# Patient Record
Sex: FEMALE | Race: BLACK OR AFRICAN AMERICAN
[De-identification: names, ages, dates, MRNs, and addresses within clinical notes are randomized per-mention and may not be internally consistent; named-entity substitution may affect disease eponyms.]

---

## 2017-04-26 ENCOUNTER — HOSPITAL ENCOUNTER (EMERGENCY)
Dept: HOSPITAL 82 - ED | Age: 62
Discharge: HOME | DRG: 305 | End: 2017-04-26
Payer: COMMERCIAL

## 2017-04-26 VITALS — SYSTOLIC BLOOD PRESSURE: 105 MMHG | DIASTOLIC BLOOD PRESSURE: 57 MMHG

## 2017-04-26 VITALS — WEIGHT: 143.3 LBS | HEIGHT: 62 IN | BODY MASS INDEX: 26.37 KG/M2

## 2017-04-26 DIAGNOSIS — Z91.14: ICD-10-CM

## 2017-04-26 DIAGNOSIS — M10.072: ICD-10-CM

## 2017-04-26 DIAGNOSIS — R94.31: ICD-10-CM

## 2017-04-26 DIAGNOSIS — I10: Primary | ICD-10-CM

## 2017-04-26 LAB
ALBUMIN SERPL-MCNC: 4.4 G/DL (ref 3.2–5)
ALP SERPL-CCNC: 95 U/L (ref 38–126)
ALT SERPL-CCNC: 43 U/L (ref 9–52)
ANION GAP SERPL CALCULATED.3IONS-SCNC: 19 MMOL/L
AST SERPL-CCNC: 47 U/L (ref 14–36)
BASOPHILS NFR BLD AUTO: 1 % (ref 0–3)
BUN SERPL-MCNC: 20 MG/DL (ref 7–17)
BUN/CREAT SERPL: 16
CALCIUM SERPL-MCNC: 10.1 MG/DL (ref 8.4–10.2)
CHLORIDE SERPL-SCNC: 106 MMOL/L (ref 95–108)
CO2 SERPL-SCNC: 26 MMOL/L (ref 22–30)
CREAT SERPL-MCNC: 1.3 MG/DL (ref 0.5–1)
EOSINOPHIL NFR BLD AUTO: 1 % (ref 0–8)
ERYTHROCYTE [DISTWIDTH] IN BLOOD BY AUTOMATED COUNT: 13.2 % (ref 11.5–15.5)
GLUCOSE SERPL-MCNC: 134 MG/DL (ref 65–105)
HCT VFR BLD AUTO: 39.9 % (ref 37–47)
HGB BLD-MCNC: 14.6 G/DL (ref 12–16)
IMM GRANULOCYTES NFR BLD: 0.3 % (ref 0–1)
LYMPHOCYTES NFR BLD: 35 % (ref 15–41)
MCH RBC QN AUTO: 30 PG  CALC (ref 26–32)
MCHC RBC AUTO-ENTMCNC: 36.6 G/L CALC (ref 32–36)
MCV RBC AUTO: 81.9 FL  CALC (ref 80–100)
MONOCYTES NFR BLD AUTO: 10 % (ref 2–13)
MYOGLOBIN SERPL-MCNC: 34 NG/ML (ref 0–62)
NEUTROPHILS # BLD AUTO: 3.83 THOU/UL (ref 2–7.15)
NEUTROPHILS NFR BLD AUTO: 53 % (ref 42–76)
PLATELET # BLD AUTO: 241 THOU/UL (ref 130–400)
POTASSIUM SERPL-SCNC: 4.6 MMOL/L (ref 3.5–5.1)
PROT SERPL-MCNC: 9 G/DL (ref 6.3–8.2)
RBC # BLD AUTO: 4.87 MILL/UL (ref 4.2–5.6)
SODIUM SERPL-SCNC: 146 MMOL/L (ref 137–146)

## 2017-09-15 ENCOUNTER — HOSPITAL ENCOUNTER (INPATIENT)
Dept: HOSPITAL 82 - ED | Age: 62
LOS: 7 days | Discharge: HOME | DRG: 208 | End: 2017-09-22
Attending: INTERNAL MEDICINE | Admitting: INTERNAL MEDICINE
Payer: COMMERCIAL

## 2017-09-15 VITALS — WEIGHT: 154.32 LBS | BODY MASS INDEX: 28.4 KG/M2 | HEIGHT: 62 IN

## 2017-09-15 DIAGNOSIS — N18.3: ICD-10-CM

## 2017-09-15 DIAGNOSIS — J18.9: Primary | ICD-10-CM

## 2017-09-15 DIAGNOSIS — F12.188: ICD-10-CM

## 2017-09-15 DIAGNOSIS — F10.188: ICD-10-CM

## 2017-09-15 DIAGNOSIS — Z86.73: ICD-10-CM

## 2017-09-15 DIAGNOSIS — E11.22: ICD-10-CM

## 2017-09-15 DIAGNOSIS — J96.01: ICD-10-CM

## 2017-09-15 DIAGNOSIS — F14.188: ICD-10-CM

## 2017-09-15 DIAGNOSIS — F17.210: ICD-10-CM

## 2017-09-15 DIAGNOSIS — Z91.19: ICD-10-CM

## 2017-09-15 DIAGNOSIS — I27.2: ICD-10-CM

## 2017-09-15 DIAGNOSIS — N17.9: ICD-10-CM

## 2017-09-15 DIAGNOSIS — I16.0: ICD-10-CM

## 2017-09-15 DIAGNOSIS — Z79.4: ICD-10-CM

## 2017-09-15 DIAGNOSIS — I13.10: ICD-10-CM

## 2017-09-15 DIAGNOSIS — B19.20: ICD-10-CM

## 2017-09-15 DIAGNOSIS — M10.9: ICD-10-CM

## 2017-09-15 DIAGNOSIS — R74.8: ICD-10-CM

## 2017-09-15 LAB
BASOPHILS NFR BLD AUTO: 0 % (ref 0–3)
EOSINOPHIL NFR BLD AUTO: 2 % (ref 0–8)
ERYTHROCYTE [DISTWIDTH] IN BLOOD BY AUTOMATED COUNT: 14.3 % (ref 11.5–15.5)
HCT VFR BLD AUTO: 44.2 % (ref 37–47)
HGB BLD-MCNC: 15.8 G/DL (ref 12–16)
IMM GRANULOCYTES NFR BLD: 0.5 % (ref 0–1)
LYMPHOCYTES NFR BLD: 37 % (ref 15–41)
MCH RBC QN AUTO: 29.4 PG  CALC (ref 26–32)
MCHC RBC AUTO-ENTMCNC: 35.7 G/L CALC (ref 32–36)
MCV RBC AUTO: 82.2 FL  CALC (ref 80–100)
MONOCYTES NFR BLD AUTO: 8 % (ref 2–13)
NEUTROPHILS # BLD AUTO: 11.01 THOU/UL (ref 2–7.15)
NEUTROPHILS NFR BLD AUTO: 53 % (ref 42–76)
PLATELET # BLD AUTO: 315 THOU/UL (ref 130–400)
RBC # BLD AUTO: 5.38 MILL/UL (ref 4.2–5.6)

## 2017-09-15 PROCEDURE — S0164 INJECTION PANTROPRAZOLE: HCPCS

## 2017-09-16 VITALS — DIASTOLIC BLOOD PRESSURE: 90 MMHG | SYSTOLIC BLOOD PRESSURE: 190 MMHG

## 2017-09-16 VITALS — DIASTOLIC BLOOD PRESSURE: 76 MMHG | SYSTOLIC BLOOD PRESSURE: 144 MMHG

## 2017-09-16 VITALS — DIASTOLIC BLOOD PRESSURE: 64 MMHG | SYSTOLIC BLOOD PRESSURE: 107 MMHG

## 2017-09-16 VITALS — SYSTOLIC BLOOD PRESSURE: 190 MMHG | DIASTOLIC BLOOD PRESSURE: 90 MMHG

## 2017-09-16 VITALS — SYSTOLIC BLOOD PRESSURE: 175 MMHG | DIASTOLIC BLOOD PRESSURE: 88 MMHG

## 2017-09-16 VITALS — SYSTOLIC BLOOD PRESSURE: 155 MMHG | DIASTOLIC BLOOD PRESSURE: 82 MMHG

## 2017-09-16 VITALS — DIASTOLIC BLOOD PRESSURE: 81 MMHG | SYSTOLIC BLOOD PRESSURE: 171 MMHG

## 2017-09-16 VITALS — DIASTOLIC BLOOD PRESSURE: 84 MMHG | SYSTOLIC BLOOD PRESSURE: 189 MMHG

## 2017-09-16 VITALS — DIASTOLIC BLOOD PRESSURE: 81 MMHG | SYSTOLIC BLOOD PRESSURE: 166 MMHG

## 2017-09-16 VITALS — DIASTOLIC BLOOD PRESSURE: 86 MMHG | SYSTOLIC BLOOD PRESSURE: 195 MMHG

## 2017-09-16 VITALS — SYSTOLIC BLOOD PRESSURE: 115 MMHG | DIASTOLIC BLOOD PRESSURE: 64 MMHG

## 2017-09-16 VITALS — SYSTOLIC BLOOD PRESSURE: 181 MMHG | DIASTOLIC BLOOD PRESSURE: 100 MMHG

## 2017-09-16 VITALS — DIASTOLIC BLOOD PRESSURE: 83 MMHG | SYSTOLIC BLOOD PRESSURE: 182 MMHG

## 2017-09-16 VITALS — SYSTOLIC BLOOD PRESSURE: 182 MMHG | DIASTOLIC BLOOD PRESSURE: 83 MMHG

## 2017-09-16 VITALS — SYSTOLIC BLOOD PRESSURE: 159 MMHG | DIASTOLIC BLOOD PRESSURE: 78 MMHG

## 2017-09-16 VITALS — DIASTOLIC BLOOD PRESSURE: 65 MMHG | SYSTOLIC BLOOD PRESSURE: 131 MMHG

## 2017-09-16 VITALS — SYSTOLIC BLOOD PRESSURE: 158 MMHG | DIASTOLIC BLOOD PRESSURE: 76 MMHG

## 2017-09-16 VITALS — SYSTOLIC BLOOD PRESSURE: 220 MMHG | DIASTOLIC BLOOD PRESSURE: 109 MMHG

## 2017-09-16 VITALS — SYSTOLIC BLOOD PRESSURE: 233 MMHG | DIASTOLIC BLOOD PRESSURE: 130 MMHG

## 2017-09-16 VITALS — DIASTOLIC BLOOD PRESSURE: 78 MMHG | SYSTOLIC BLOOD PRESSURE: 174 MMHG

## 2017-09-16 VITALS — DIASTOLIC BLOOD PRESSURE: 126 MMHG | SYSTOLIC BLOOD PRESSURE: 222 MMHG

## 2017-09-16 VITALS — DIASTOLIC BLOOD PRESSURE: 85 MMHG | SYSTOLIC BLOOD PRESSURE: 165 MMHG

## 2017-09-16 VITALS — SYSTOLIC BLOOD PRESSURE: 172 MMHG | DIASTOLIC BLOOD PRESSURE: 80 MMHG

## 2017-09-16 VITALS — SYSTOLIC BLOOD PRESSURE: 184 MMHG | DIASTOLIC BLOOD PRESSURE: 73 MMHG

## 2017-09-16 VITALS — DIASTOLIC BLOOD PRESSURE: 73 MMHG | SYSTOLIC BLOOD PRESSURE: 142 MMHG

## 2017-09-16 VITALS — DIASTOLIC BLOOD PRESSURE: 80 MMHG | SYSTOLIC BLOOD PRESSURE: 169 MMHG

## 2017-09-16 LAB
ALBUMIN SERPL-MCNC: 4 G/DL (ref 3.2–5)
ALP SERPL-CCNC: 104 U/L (ref 38–126)
ALT SERPL-CCNC: 56 U/L (ref 9–52)
ANION GAP SERPL CALCULATED.3IONS-SCNC: 15 MMOL/L
ANION GAP SERPL CALCULATED.3IONS-SCNC: 16 MMOL/L
AST SERPL-CCNC: 61 U/L (ref 14–36)
BACTERIA #/AREA URNS HPF: (no result) HPF
BARBITURATES UR-MCNC: NEGATIVE UG/ML
BASOPHILS NFR BLD AUTO: 0 % (ref 0–3)
BILIRUB UR QL STRIP.AUTO: NEGATIVE
BUN SERPL-MCNC: 15 MG/DL (ref 7–17)
BUN SERPL-MCNC: 19 MG/DL (ref 7–17)
BUN/CREAT SERPL: 12
BUN/CREAT SERPL: 14
CALCIUM SERPL-MCNC: 8.4 MG/DL (ref 8.4–10.2)
CALCIUM SERPL-MCNC: 8.8 MG/DL (ref 8.4–10.2)
CHLORIDE SERPL-SCNC: 106 MMOL/L (ref 95–108)
CHLORIDE SERPL-SCNC: 108 MMOL/L (ref 95–108)
CLARITY UR: (no result)
CO2 SERPL-SCNC: 21 MMOL/L (ref 22–30)
CO2 SERPL-SCNC: 23 MMOL/L (ref 22–30)
COARSE GRAN CASTS URNS QL MICRO: (no result) LPF
COCAINE UR-MCNC: POSITIVE NG/ML
COLOR UR AUTO: YELLOW
CREAT SERPL-MCNC: 1.2 MG/DL (ref 0.5–1)
CREAT SERPL-MCNC: 1.4 MG/DL (ref 0.5–1)
EOSINOPHIL NFR BLD AUTO: 0 % (ref 0–8)
ERYTHROCYTE [DISTWIDTH] IN BLOOD BY AUTOMATED COUNT: 13.4 % (ref 11.5–15.5)
FINE GRAN CASTS URNS QL MICRO: (no result) LPF
GLUCOSE SERPL-MCNC: 301 MG/DL (ref 65–105)
GLUCOSE SERPL-MCNC: 302 MG/DL (ref 65–105)
GLUCOSE UR STRIP.AUTO-MCNC: 100 MG/DL
HCT VFR BLD AUTO: 31.9 % (ref 37–47)
HGB BLD-MCNC: 11.6 G/DL (ref 12–16)
HGB UR QL STRIP.AUTO: (no result)
IMM GRANULOCYTES NFR BLD: 0.3 % (ref 0–1)
INR PPP: 1 RATIO (ref 0.7–1.3)
KETONES UR STRIP.AUTO-MCNC: NEGATIVE MG/DL
LEUKOCYTE ESTERASE UR QL STRIP.AUTO: NEGATIVE
LIPASE SERPL-CCNC: 30 U/L (ref 23–300)
LYMPHOCYTES NFR BLD: 9 % (ref 15–41)
MAGNESIUM SERPL-MCNC: 1.9 MG/DL (ref 1.6–2.3)
MCH RBC QN AUTO: 29.1 PG  CALC (ref 26–32)
MCHC RBC AUTO-ENTMCNC: 36.4 G/L CALC (ref 32–36)
MCV RBC AUTO: 80.2 FL  CALC (ref 80–100)
METHADONE SERPL-MCNC: NEGATIVE NG/ML
MONOCYTES NFR BLD AUTO: 2 % (ref 2–13)
MYOGLOBIN SERPL-MCNC: 188 NG/ML (ref 0–62)
NEUTROPHILS # BLD AUTO: 10.32 THOU/UL (ref 2–7.15)
NEUTROPHILS NFR BLD AUTO: 89 % (ref 42–76)
NITRITE UR QL STRIP.AUTO: NEGATIVE
OXCYCODONE: NEGATIVE
PH UR STRIP.AUTO: 6.5 [PH] (ref 4.5–8)
PLATELET # BLD AUTO: 206 THOU/UL (ref 130–400)
POTASSIUM SERPL-SCNC: 3.7 MMOL/L (ref 3.5–5.1)
POTASSIUM SERPL-SCNC: 3.8 MMOL/L (ref 3.5–5.1)
PROT SERPL-MCNC: 8.4 G/DL (ref 6.3–8.2)
PROT UR QL STRIP.AUTO: >=300 MG/DL
PROTHROMBIN TIME: 10.7 SECONDS (ref 9–12.5)
RBC # BLD AUTO: 3.98 MILL/UL (ref 4.2–5.6)
RBC #/AREA URNS HPF: (no result) RBC/HPF (ref 0–5)
SERVICE CMNT 15-IMP: (no result) HPF
SODIUM SERPL-SCNC: 141 MMOL/L (ref 137–146)
SODIUM SERPL-SCNC: 141 MMOL/L (ref 137–146)
SP GR UR STRIP.AUTO: 1.02
SQUAMOUS URNS QL MICRO: (no result) EPI/HPF
TETRAHYDROCANNABIONOL: POSITIVE
TRICYLIC ANTIDEPRESSANTS: NEGATIVE
TSH SERPL DL<=0.05 MIU/L-ACNC: 2.35 UIU/ML (ref 0.47–4.68)
UROBILINOGEN UR QL STRIP.AUTO: 0.2 E.U./DL
WBC #/AREA URNS HPF: (no result) WBC/HPF (ref 0–5)

## 2017-09-16 PROCEDURE — 0T9B70Z DRAINAGE OF BLADDER WITH DRAINAGE DEVICE, VIA NATURAL OR ARTIFICIAL OPENING: ICD-10-PCS | Performed by: EMERGENCY MEDICINE

## 2017-09-16 PROCEDURE — 0BH17EZ INSERTION OF ENDOTRACHEAL AIRWAY INTO TRACHEA, VIA NATURAL OR ARTIFICIAL OPENING: ICD-10-PCS | Performed by: EMERGENCY MEDICINE

## 2017-09-16 PROCEDURE — 5A1945Z RESPIRATORY VENTILATION, 24-96 CONSECUTIVE HOURS: ICD-10-PCS | Performed by: EMERGENCY MEDICINE

## 2017-09-17 VITALS — DIASTOLIC BLOOD PRESSURE: 63 MMHG | SYSTOLIC BLOOD PRESSURE: 116 MMHG

## 2017-09-17 VITALS — SYSTOLIC BLOOD PRESSURE: 119 MMHG | DIASTOLIC BLOOD PRESSURE: 61 MMHG

## 2017-09-17 VITALS — SYSTOLIC BLOOD PRESSURE: 158 MMHG | DIASTOLIC BLOOD PRESSURE: 112 MMHG

## 2017-09-17 VITALS — DIASTOLIC BLOOD PRESSURE: 70 MMHG | SYSTOLIC BLOOD PRESSURE: 166 MMHG

## 2017-09-17 VITALS — DIASTOLIC BLOOD PRESSURE: 64 MMHG | SYSTOLIC BLOOD PRESSURE: 121 MMHG

## 2017-09-17 VITALS — SYSTOLIC BLOOD PRESSURE: 178 MMHG | DIASTOLIC BLOOD PRESSURE: 83 MMHG

## 2017-09-17 VITALS — DIASTOLIC BLOOD PRESSURE: 64 MMHG | SYSTOLIC BLOOD PRESSURE: 124 MMHG

## 2017-09-17 VITALS — SYSTOLIC BLOOD PRESSURE: 151 MMHG | DIASTOLIC BLOOD PRESSURE: 80 MMHG

## 2017-09-17 VITALS — SYSTOLIC BLOOD PRESSURE: 191 MMHG | DIASTOLIC BLOOD PRESSURE: 95 MMHG

## 2017-09-17 VITALS — SYSTOLIC BLOOD PRESSURE: 170 MMHG | DIASTOLIC BLOOD PRESSURE: 84 MMHG

## 2017-09-17 VITALS — SYSTOLIC BLOOD PRESSURE: 189 MMHG | DIASTOLIC BLOOD PRESSURE: 82 MMHG

## 2017-09-17 VITALS — SYSTOLIC BLOOD PRESSURE: 191 MMHG | DIASTOLIC BLOOD PRESSURE: 92 MMHG

## 2017-09-17 VITALS — SYSTOLIC BLOOD PRESSURE: 119 MMHG | DIASTOLIC BLOOD PRESSURE: 62 MMHG

## 2017-09-17 VITALS — SYSTOLIC BLOOD PRESSURE: 161 MMHG | DIASTOLIC BLOOD PRESSURE: 77 MMHG

## 2017-09-17 VITALS — SYSTOLIC BLOOD PRESSURE: 208 MMHG | DIASTOLIC BLOOD PRESSURE: 104 MMHG

## 2017-09-17 VITALS — SYSTOLIC BLOOD PRESSURE: 208 MMHG | DIASTOLIC BLOOD PRESSURE: 95 MMHG

## 2017-09-17 VITALS — DIASTOLIC BLOOD PRESSURE: 84 MMHG | SYSTOLIC BLOOD PRESSURE: 170 MMHG

## 2017-09-17 VITALS — DIASTOLIC BLOOD PRESSURE: 91 MMHG | SYSTOLIC BLOOD PRESSURE: 202 MMHG

## 2017-09-17 VITALS — DIASTOLIC BLOOD PRESSURE: 97 MMHG | SYSTOLIC BLOOD PRESSURE: 197 MMHG

## 2017-09-17 VITALS — SYSTOLIC BLOOD PRESSURE: 180 MMHG | DIASTOLIC BLOOD PRESSURE: 87 MMHG

## 2017-09-17 VITALS — SYSTOLIC BLOOD PRESSURE: 120 MMHG | DIASTOLIC BLOOD PRESSURE: 63 MMHG

## 2017-09-17 VITALS — SYSTOLIC BLOOD PRESSURE: 146 MMHG | DIASTOLIC BLOOD PRESSURE: 76 MMHG

## 2017-09-17 VITALS — DIASTOLIC BLOOD PRESSURE: 103 MMHG | SYSTOLIC BLOOD PRESSURE: 211 MMHG

## 2017-09-17 VITALS — DIASTOLIC BLOOD PRESSURE: 74 MMHG | SYSTOLIC BLOOD PRESSURE: 146 MMHG

## 2017-09-17 VITALS — SYSTOLIC BLOOD PRESSURE: 177 MMHG | DIASTOLIC BLOOD PRESSURE: 91 MMHG

## 2017-09-17 VITALS — SYSTOLIC BLOOD PRESSURE: 164 MMHG | DIASTOLIC BLOOD PRESSURE: 76 MMHG

## 2017-09-17 VITALS — SYSTOLIC BLOOD PRESSURE: 126 MMHG | DIASTOLIC BLOOD PRESSURE: 65 MMHG

## 2017-09-17 VITALS — DIASTOLIC BLOOD PRESSURE: 64 MMHG | SYSTOLIC BLOOD PRESSURE: 123 MMHG

## 2017-09-17 VITALS — SYSTOLIC BLOOD PRESSURE: 163 MMHG | DIASTOLIC BLOOD PRESSURE: 79 MMHG

## 2017-09-17 VITALS — DIASTOLIC BLOOD PRESSURE: 62 MMHG | SYSTOLIC BLOOD PRESSURE: 122 MMHG

## 2017-09-17 VITALS — SYSTOLIC BLOOD PRESSURE: 176 MMHG | DIASTOLIC BLOOD PRESSURE: 96 MMHG

## 2017-09-17 VITALS — SYSTOLIC BLOOD PRESSURE: 160 MMHG | DIASTOLIC BLOOD PRESSURE: 94 MMHG

## 2017-09-17 VITALS — DIASTOLIC BLOOD PRESSURE: 91 MMHG | SYSTOLIC BLOOD PRESSURE: 187 MMHG

## 2017-09-17 VITALS — SYSTOLIC BLOOD PRESSURE: 160 MMHG | DIASTOLIC BLOOD PRESSURE: 78 MMHG

## 2017-09-17 VITALS — SYSTOLIC BLOOD PRESSURE: 183 MMHG | DIASTOLIC BLOOD PRESSURE: 84 MMHG

## 2017-09-17 VITALS — DIASTOLIC BLOOD PRESSURE: 61 MMHG | SYSTOLIC BLOOD PRESSURE: 120 MMHG

## 2017-09-17 VITALS — DIASTOLIC BLOOD PRESSURE: 84 MMHG | SYSTOLIC BLOOD PRESSURE: 193 MMHG

## 2017-09-17 VITALS — SYSTOLIC BLOOD PRESSURE: 156 MMHG | DIASTOLIC BLOOD PRESSURE: 78 MMHG

## 2017-09-17 VITALS — SYSTOLIC BLOOD PRESSURE: 184 MMHG | DIASTOLIC BLOOD PRESSURE: 88 MMHG

## 2017-09-17 VITALS — SYSTOLIC BLOOD PRESSURE: 194 MMHG | DIASTOLIC BLOOD PRESSURE: 91 MMHG

## 2017-09-17 VITALS — DIASTOLIC BLOOD PRESSURE: 95 MMHG | SYSTOLIC BLOOD PRESSURE: 200 MMHG

## 2017-09-17 VITALS — DIASTOLIC BLOOD PRESSURE: 78 MMHG | SYSTOLIC BLOOD PRESSURE: 166 MMHG

## 2017-09-17 LAB
ALBUMIN SERPL-MCNC: 3.4 G/DL (ref 3.2–5)
ALP SERPL-CCNC: 63 U/L (ref 38–126)
ALT SERPL-CCNC: 47 U/L (ref 9–52)
AMYLASE SERPL-CCNC: 91 U/L (ref 30–110)
ANION GAP SERPL CALCULATED.3IONS-SCNC: 13 MMOL/L
ANION GAP SERPL CALCULATED.3IONS-SCNC: 15 MMOL/L
ANION GAP SERPL CALCULATED.3IONS-SCNC: 17 MMOL/L
AST SERPL-CCNC: 40 U/L (ref 14–36)
BASOPHILS NFR BLD AUTO: 0 % (ref 0–3)
BASOPHILS NFR BLD AUTO: 1 % (ref 0–3)
BUN SERPL-MCNC: 21 MG/DL (ref 7–17)
BUN SERPL-MCNC: 21 MG/DL (ref 7–17)
BUN SERPL-MCNC: 23 MG/DL (ref 7–17)
BUN/CREAT SERPL: 12
BUN/CREAT SERPL: 12
BUN/CREAT SERPL: 13
CALCIUM SERPL-MCNC: 8.6 MG/DL (ref 8.4–10.2)
CALCIUM SERPL-MCNC: 8.8 MG/DL (ref 8.4–10.2)
CALCIUM SERPL-MCNC: 9.1 MG/DL (ref 8.4–10.2)
CHLORIDE SERPL-SCNC: 111 MMOL/L (ref 95–108)
CHLORIDE SERPL-SCNC: 114 MMOL/L (ref 95–108)
CHLORIDE SERPL-SCNC: 116 MMOL/L (ref 95–108)
CO2 SERPL-SCNC: 18 MMOL/L (ref 22–30)
CO2 SERPL-SCNC: 20 MMOL/L (ref 22–30)
CO2 SERPL-SCNC: 20 MMOL/L (ref 22–30)
CREAT SERPL-MCNC: 1.7 MG/DL (ref 0.5–1)
CREAT SERPL-MCNC: 1.8 MG/DL (ref 0.5–1)
CREAT SERPL-MCNC: 2 MG/DL (ref 0.5–1)
EOSINOPHIL NFR BLD AUTO: 0 % (ref 0–8)
EOSINOPHIL NFR BLD AUTO: 1 % (ref 0–8)
ERYTHROCYTE [DISTWIDTH] IN BLOOD BY AUTOMATED COUNT: 13.7 % (ref 11.5–15.5)
ERYTHROCYTE [DISTWIDTH] IN BLOOD BY AUTOMATED COUNT: 14 % (ref 11.5–15.5)
GLUCOSE SERPL-MCNC: 106 MG/DL (ref 65–105)
GLUCOSE SERPL-MCNC: 124 MG/DL (ref 65–105)
GLUCOSE SERPL-MCNC: 145 MG/DL (ref 65–105)
HCT VFR BLD AUTO: 28.2 % (ref 37–47)
HCT VFR BLD AUTO: 32.2 % (ref 37–47)
HGB BLD-MCNC: 10.2 G/DL (ref 12–16)
HGB BLD-MCNC: 11.9 G/DL (ref 12–16)
IMM GRANULOCYTES NFR BLD: 0.3 % (ref 0–1)
IMM GRANULOCYTES NFR BLD: 0.4 % (ref 0–1)
LYMPHOCYTES NFR BLD: 14 % (ref 15–41)
LYMPHOCYTES NFR BLD: 17 % (ref 15–41)
MCH RBC QN AUTO: 29.6 PG  CALC (ref 26–32)
MCH RBC QN AUTO: 29.8 PG  CALC (ref 26–32)
MCHC RBC AUTO-ENTMCNC: 36.2 G/L CALC (ref 32–36)
MCHC RBC AUTO-ENTMCNC: 37 G/L CALC (ref 32–36)
MCV RBC AUTO: 80.7 FL  CALC (ref 80–100)
MCV RBC AUTO: 81.7 FL  CALC (ref 80–100)
MONOCYTES NFR BLD AUTO: 10 % (ref 2–13)
MONOCYTES NFR BLD AUTO: 11 % (ref 2–13)
NEUTROPHILS # BLD AUTO: 5.97 THOU/UL (ref 2–7.15)
NEUTROPHILS # BLD AUTO: 8.78 THOU/UL (ref 2–7.15)
NEUTROPHILS NFR BLD AUTO: 73 % (ref 42–76)
NEUTROPHILS NFR BLD AUTO: 73 % (ref 42–76)
PLATELET # BLD AUTO: 205 THOU/UL (ref 130–400)
PLATELET # BLD AUTO: 245 THOU/UL (ref 130–400)
POTASSIUM SERPL-SCNC: 3.6 MMOL/L (ref 3.5–5.1)
POTASSIUM SERPL-SCNC: 3.8 MMOL/L (ref 3.5–5.1)
POTASSIUM SERPL-SCNC: 4 MMOL/L (ref 3.5–5.1)
PROT SERPL-MCNC: 7 G/DL (ref 6.3–8.2)
RBC # BLD AUTO: 3.45 MILL/UL (ref 4.2–5.6)
RBC # BLD AUTO: 3.99 MILL/UL (ref 4.2–5.6)
SODIUM SERPL-SCNC: 143 MMOL/L (ref 137–146)
SODIUM SERPL-SCNC: 144 MMOL/L (ref 137–146)
SODIUM SERPL-SCNC: 144 MMOL/L (ref 137–146)

## 2017-09-18 VITALS — DIASTOLIC BLOOD PRESSURE: 64 MMHG | SYSTOLIC BLOOD PRESSURE: 124 MMHG

## 2017-09-18 VITALS — SYSTOLIC BLOOD PRESSURE: 186 MMHG | DIASTOLIC BLOOD PRESSURE: 92 MMHG

## 2017-09-18 VITALS — SYSTOLIC BLOOD PRESSURE: 137 MMHG | DIASTOLIC BLOOD PRESSURE: 7 MMHG

## 2017-09-18 VITALS — SYSTOLIC BLOOD PRESSURE: 181 MMHG | DIASTOLIC BLOOD PRESSURE: 89 MMHG

## 2017-09-18 VITALS — DIASTOLIC BLOOD PRESSURE: 90 MMHG | SYSTOLIC BLOOD PRESSURE: 185 MMHG

## 2017-09-18 VITALS — DIASTOLIC BLOOD PRESSURE: 90 MMHG | SYSTOLIC BLOOD PRESSURE: 183 MMHG

## 2017-09-18 VITALS — DIASTOLIC BLOOD PRESSURE: 87 MMHG | SYSTOLIC BLOOD PRESSURE: 188 MMHG

## 2017-09-18 VITALS — SYSTOLIC BLOOD PRESSURE: 149 MMHG | DIASTOLIC BLOOD PRESSURE: 78 MMHG

## 2017-09-18 VITALS — DIASTOLIC BLOOD PRESSURE: 62 MMHG | SYSTOLIC BLOOD PRESSURE: 110 MMHG

## 2017-09-18 VITALS — SYSTOLIC BLOOD PRESSURE: 188 MMHG | DIASTOLIC BLOOD PRESSURE: 84 MMHG

## 2017-09-18 VITALS — DIASTOLIC BLOOD PRESSURE: 81 MMHG | SYSTOLIC BLOOD PRESSURE: 161 MMHG

## 2017-09-18 VITALS — SYSTOLIC BLOOD PRESSURE: 105 MMHG | DIASTOLIC BLOOD PRESSURE: 61 MMHG

## 2017-09-18 VITALS — SYSTOLIC BLOOD PRESSURE: 127 MMHG | DIASTOLIC BLOOD PRESSURE: 87 MMHG

## 2017-09-18 VITALS — SYSTOLIC BLOOD PRESSURE: 162 MMHG | DIASTOLIC BLOOD PRESSURE: 76 MMHG

## 2017-09-18 VITALS — DIASTOLIC BLOOD PRESSURE: 71 MMHG | SYSTOLIC BLOOD PRESSURE: 140 MMHG

## 2017-09-18 VITALS — DIASTOLIC BLOOD PRESSURE: 86 MMHG | SYSTOLIC BLOOD PRESSURE: 136 MMHG

## 2017-09-18 VITALS — SYSTOLIC BLOOD PRESSURE: 144 MMHG | DIASTOLIC BLOOD PRESSURE: 70 MMHG

## 2017-09-18 VITALS — SYSTOLIC BLOOD PRESSURE: 138 MMHG | DIASTOLIC BLOOD PRESSURE: 82 MMHG

## 2017-09-18 VITALS — SYSTOLIC BLOOD PRESSURE: 159 MMHG | DIASTOLIC BLOOD PRESSURE: 77 MMHG

## 2017-09-18 VITALS — DIASTOLIC BLOOD PRESSURE: 91 MMHG | SYSTOLIC BLOOD PRESSURE: 186 MMHG

## 2017-09-18 VITALS — SYSTOLIC BLOOD PRESSURE: 144 MMHG | DIASTOLIC BLOOD PRESSURE: 69 MMHG

## 2017-09-18 VITALS — SYSTOLIC BLOOD PRESSURE: 183 MMHG | DIASTOLIC BLOOD PRESSURE: 86 MMHG

## 2017-09-18 VITALS — DIASTOLIC BLOOD PRESSURE: 72 MMHG | SYSTOLIC BLOOD PRESSURE: 140 MMHG

## 2017-09-18 VITALS — DIASTOLIC BLOOD PRESSURE: 79 MMHG | SYSTOLIC BLOOD PRESSURE: 155 MMHG

## 2017-09-18 VITALS — DIASTOLIC BLOOD PRESSURE: 66 MMHG | SYSTOLIC BLOOD PRESSURE: 141 MMHG

## 2017-09-18 VITALS — SYSTOLIC BLOOD PRESSURE: 134 MMHG | DIASTOLIC BLOOD PRESSURE: 71 MMHG

## 2017-09-18 VITALS — SYSTOLIC BLOOD PRESSURE: 140 MMHG | DIASTOLIC BLOOD PRESSURE: 71 MMHG

## 2017-09-18 VITALS — SYSTOLIC BLOOD PRESSURE: 189 MMHG | DIASTOLIC BLOOD PRESSURE: 92 MMHG

## 2017-09-18 VITALS — DIASTOLIC BLOOD PRESSURE: 92 MMHG | SYSTOLIC BLOOD PRESSURE: 186 MMHG

## 2017-09-18 VITALS — SYSTOLIC BLOOD PRESSURE: 134 MMHG | DIASTOLIC BLOOD PRESSURE: 70 MMHG

## 2017-09-18 VITALS — SYSTOLIC BLOOD PRESSURE: 153 MMHG | DIASTOLIC BLOOD PRESSURE: 82 MMHG

## 2017-09-18 VITALS — SYSTOLIC BLOOD PRESSURE: 185 MMHG | DIASTOLIC BLOOD PRESSURE: 81 MMHG

## 2017-09-18 VITALS — DIASTOLIC BLOOD PRESSURE: 90 MMHG | SYSTOLIC BLOOD PRESSURE: 181 MMHG

## 2017-09-18 VITALS — DIASTOLIC BLOOD PRESSURE: 72 MMHG | SYSTOLIC BLOOD PRESSURE: 136 MMHG

## 2017-09-18 VITALS — SYSTOLIC BLOOD PRESSURE: 163 MMHG | DIASTOLIC BLOOD PRESSURE: 90 MMHG

## 2017-09-18 VITALS — SYSTOLIC BLOOD PRESSURE: 156 MMHG | DIASTOLIC BLOOD PRESSURE: 76 MMHG

## 2017-09-18 VITALS — DIASTOLIC BLOOD PRESSURE: 68 MMHG | SYSTOLIC BLOOD PRESSURE: 147 MMHG

## 2017-09-18 VITALS — DIASTOLIC BLOOD PRESSURE: 74 MMHG | SYSTOLIC BLOOD PRESSURE: 139 MMHG

## 2017-09-18 VITALS — DIASTOLIC BLOOD PRESSURE: 72 MMHG | SYSTOLIC BLOOD PRESSURE: 134 MMHG

## 2017-09-18 VITALS — DIASTOLIC BLOOD PRESSURE: 76 MMHG | SYSTOLIC BLOOD PRESSURE: 142 MMHG

## 2017-09-18 VITALS — DIASTOLIC BLOOD PRESSURE: 70 MMHG | SYSTOLIC BLOOD PRESSURE: 131 MMHG

## 2017-09-18 VITALS — SYSTOLIC BLOOD PRESSURE: 142 MMHG | DIASTOLIC BLOOD PRESSURE: 74 MMHG

## 2017-09-18 VITALS — SYSTOLIC BLOOD PRESSURE: 189 MMHG | DIASTOLIC BLOOD PRESSURE: 84 MMHG

## 2017-09-18 VITALS — SYSTOLIC BLOOD PRESSURE: 148 MMHG | DIASTOLIC BLOOD PRESSURE: 61 MMHG

## 2017-09-18 VITALS — SYSTOLIC BLOOD PRESSURE: 150 MMHG | DIASTOLIC BLOOD PRESSURE: 60 MMHG

## 2017-09-18 VITALS — DIASTOLIC BLOOD PRESSURE: 75 MMHG | SYSTOLIC BLOOD PRESSURE: 138 MMHG

## 2017-09-18 VITALS — DIASTOLIC BLOOD PRESSURE: 68 MMHG | SYSTOLIC BLOOD PRESSURE: 135 MMHG

## 2017-09-18 VITALS — SYSTOLIC BLOOD PRESSURE: 183 MMHG | DIASTOLIC BLOOD PRESSURE: 93 MMHG

## 2017-09-18 VITALS — SYSTOLIC BLOOD PRESSURE: 187 MMHG | DIASTOLIC BLOOD PRESSURE: 85 MMHG

## 2017-09-18 VITALS — DIASTOLIC BLOOD PRESSURE: 79 MMHG | SYSTOLIC BLOOD PRESSURE: 150 MMHG

## 2017-09-18 VITALS — SYSTOLIC BLOOD PRESSURE: 150 MMHG | DIASTOLIC BLOOD PRESSURE: 71 MMHG

## 2017-09-18 VITALS — DIASTOLIC BLOOD PRESSURE: 73 MMHG | SYSTOLIC BLOOD PRESSURE: 149 MMHG

## 2017-09-18 VITALS — DIASTOLIC BLOOD PRESSURE: 79 MMHG | SYSTOLIC BLOOD PRESSURE: 158 MMHG

## 2017-09-18 VITALS — SYSTOLIC BLOOD PRESSURE: 139 MMHG | DIASTOLIC BLOOD PRESSURE: 63 MMHG

## 2017-09-18 VITALS — DIASTOLIC BLOOD PRESSURE: 93 MMHG | SYSTOLIC BLOOD PRESSURE: 155 MMHG

## 2017-09-18 VITALS — SYSTOLIC BLOOD PRESSURE: 184 MMHG | DIASTOLIC BLOOD PRESSURE: 92 MMHG

## 2017-09-18 VITALS — SYSTOLIC BLOOD PRESSURE: 140 MMHG | DIASTOLIC BLOOD PRESSURE: 72 MMHG

## 2017-09-18 VITALS — DIASTOLIC BLOOD PRESSURE: 70 MMHG | SYSTOLIC BLOOD PRESSURE: 149 MMHG

## 2017-09-18 VITALS — SYSTOLIC BLOOD PRESSURE: 187 MMHG | DIASTOLIC BLOOD PRESSURE: 84 MMHG

## 2017-09-18 VITALS — DIASTOLIC BLOOD PRESSURE: 77 MMHG | SYSTOLIC BLOOD PRESSURE: 149 MMHG

## 2017-09-18 VITALS — SYSTOLIC BLOOD PRESSURE: 155 MMHG | DIASTOLIC BLOOD PRESSURE: 78 MMHG

## 2017-09-18 VITALS — DIASTOLIC BLOOD PRESSURE: 64 MMHG | SYSTOLIC BLOOD PRESSURE: 137 MMHG

## 2017-09-18 VITALS — SYSTOLIC BLOOD PRESSURE: 184 MMHG | DIASTOLIC BLOOD PRESSURE: 91 MMHG

## 2017-09-18 VITALS — DIASTOLIC BLOOD PRESSURE: 94 MMHG | SYSTOLIC BLOOD PRESSURE: 184 MMHG

## 2017-09-18 VITALS — SYSTOLIC BLOOD PRESSURE: 153 MMHG | DIASTOLIC BLOOD PRESSURE: 69 MMHG

## 2017-09-18 VITALS — DIASTOLIC BLOOD PRESSURE: 91 MMHG | SYSTOLIC BLOOD PRESSURE: 173 MMHG

## 2017-09-18 VITALS — DIASTOLIC BLOOD PRESSURE: 89 MMHG | SYSTOLIC BLOOD PRESSURE: 181 MMHG

## 2017-09-18 VITALS — SYSTOLIC BLOOD PRESSURE: 173 MMHG | DIASTOLIC BLOOD PRESSURE: 77 MMHG

## 2017-09-18 VITALS — DIASTOLIC BLOOD PRESSURE: 66 MMHG | SYSTOLIC BLOOD PRESSURE: 140 MMHG

## 2017-09-18 VITALS — DIASTOLIC BLOOD PRESSURE: 75 MMHG | SYSTOLIC BLOOD PRESSURE: 156 MMHG

## 2017-09-18 VITALS — DIASTOLIC BLOOD PRESSURE: 69 MMHG | SYSTOLIC BLOOD PRESSURE: 131 MMHG

## 2017-09-18 VITALS — SYSTOLIC BLOOD PRESSURE: 156 MMHG | DIASTOLIC BLOOD PRESSURE: 83 MMHG

## 2017-09-18 VITALS — DIASTOLIC BLOOD PRESSURE: 91 MMHG | SYSTOLIC BLOOD PRESSURE: 192 MMHG

## 2017-09-18 VITALS — SYSTOLIC BLOOD PRESSURE: 135 MMHG | DIASTOLIC BLOOD PRESSURE: 65 MMHG

## 2017-09-18 VITALS — SYSTOLIC BLOOD PRESSURE: 164 MMHG | DIASTOLIC BLOOD PRESSURE: 75 MMHG

## 2017-09-18 VITALS — DIASTOLIC BLOOD PRESSURE: 76 MMHG | SYSTOLIC BLOOD PRESSURE: 146 MMHG

## 2017-09-18 LAB
ANION GAP SERPL CALCULATED.3IONS-SCNC: 12 MMOL/L
BASOPHILS NFR BLD AUTO: 1 % (ref 0–3)
BUN SERPL-MCNC: 21 MG/DL (ref 7–17)
BUN/CREAT SERPL: 12
CALCIUM SERPL-MCNC: 8.6 MG/DL (ref 8.4–10.2)
CHLORIDE SERPL-SCNC: 115 MMOL/L (ref 95–108)
CO2 SERPL-SCNC: 20 MMOL/L (ref 22–30)
CREAT SERPL-MCNC: 1.7 MG/DL (ref 0.5–1)
EOSINOPHIL NFR BLD AUTO: 4 % (ref 0–8)
ERYTHROCYTE [DISTWIDTH] IN BLOOD BY AUTOMATED COUNT: 14.1 % (ref 11.5–15.5)
GLUCOSE SERPL-MCNC: 87 MG/DL (ref 65–105)
HCT VFR BLD AUTO: 27.7 % (ref 37–47)
HGB BLD-MCNC: 10 G/DL (ref 12–16)
IMM GRANULOCYTES NFR BLD: 0.4 % (ref 0–1)
LYMPHOCYTES NFR BLD: 24 % (ref 15–41)
MCH RBC QN AUTO: 29.4 PG  CALC (ref 26–32)
MCHC RBC AUTO-ENTMCNC: 36.1 G/L CALC (ref 32–36)
MCV RBC AUTO: 81.5 FL  CALC (ref 80–100)
MONOCYTES NFR BLD AUTO: 10 % (ref 2–13)
NEUTROPHILS # BLD AUTO: 4.86 THOU/UL (ref 2–7.15)
NEUTROPHILS NFR BLD AUTO: 62 % (ref 42–76)
PLATELET # BLD AUTO: 203 THOU/UL (ref 130–400)
POTASSIUM SERPL-SCNC: 3.5 MMOL/L (ref 3.5–5.1)
RBC # BLD AUTO: 3.4 MILL/UL (ref 4.2–5.6)
SODIUM SERPL-SCNC: 144 MMOL/L (ref 137–146)

## 2017-09-19 VITALS — DIASTOLIC BLOOD PRESSURE: 74 MMHG | SYSTOLIC BLOOD PRESSURE: 172 MMHG

## 2017-09-19 VITALS — DIASTOLIC BLOOD PRESSURE: 66 MMHG | SYSTOLIC BLOOD PRESSURE: 142 MMHG

## 2017-09-19 VITALS — SYSTOLIC BLOOD PRESSURE: 180 MMHG | DIASTOLIC BLOOD PRESSURE: 79 MMHG

## 2017-09-19 VITALS — SYSTOLIC BLOOD PRESSURE: 150 MMHG | DIASTOLIC BLOOD PRESSURE: 71 MMHG

## 2017-09-19 VITALS — SYSTOLIC BLOOD PRESSURE: 156 MMHG | DIASTOLIC BLOOD PRESSURE: 75 MMHG

## 2017-09-19 VITALS — DIASTOLIC BLOOD PRESSURE: 80 MMHG | SYSTOLIC BLOOD PRESSURE: 169 MMHG

## 2017-09-19 VITALS — DIASTOLIC BLOOD PRESSURE: 71 MMHG | SYSTOLIC BLOOD PRESSURE: 167 MMHG

## 2017-09-19 VITALS — DIASTOLIC BLOOD PRESSURE: 75 MMHG | SYSTOLIC BLOOD PRESSURE: 150 MMHG

## 2017-09-19 VITALS — SYSTOLIC BLOOD PRESSURE: 140 MMHG | DIASTOLIC BLOOD PRESSURE: 64 MMHG

## 2017-09-19 VITALS — SYSTOLIC BLOOD PRESSURE: 120 MMHG | DIASTOLIC BLOOD PRESSURE: 55 MMHG

## 2017-09-19 VITALS — DIASTOLIC BLOOD PRESSURE: 43 MMHG | SYSTOLIC BLOOD PRESSURE: 93 MMHG

## 2017-09-19 VITALS — SYSTOLIC BLOOD PRESSURE: 158 MMHG | DIASTOLIC BLOOD PRESSURE: 76 MMHG

## 2017-09-19 VITALS — DIASTOLIC BLOOD PRESSURE: 67 MMHG | SYSTOLIC BLOOD PRESSURE: 162 MMHG

## 2017-09-19 VITALS — DIASTOLIC BLOOD PRESSURE: 42 MMHG | SYSTOLIC BLOOD PRESSURE: 96 MMHG

## 2017-09-19 VITALS — DIASTOLIC BLOOD PRESSURE: 87 MMHG | SYSTOLIC BLOOD PRESSURE: 107 MMHG

## 2017-09-19 VITALS — DIASTOLIC BLOOD PRESSURE: 75 MMHG | SYSTOLIC BLOOD PRESSURE: 175 MMHG

## 2017-09-19 VITALS — SYSTOLIC BLOOD PRESSURE: 156 MMHG | DIASTOLIC BLOOD PRESSURE: 71 MMHG

## 2017-09-19 VITALS — DIASTOLIC BLOOD PRESSURE: 76 MMHG | SYSTOLIC BLOOD PRESSURE: 149 MMHG

## 2017-09-19 VITALS — DIASTOLIC BLOOD PRESSURE: 48 MMHG | SYSTOLIC BLOOD PRESSURE: 99 MMHG

## 2017-09-19 VITALS — DIASTOLIC BLOOD PRESSURE: 87 MMHG | SYSTOLIC BLOOD PRESSURE: 188 MMHG

## 2017-09-19 VITALS — SYSTOLIC BLOOD PRESSURE: 174 MMHG | DIASTOLIC BLOOD PRESSURE: 85 MMHG

## 2017-09-19 VITALS — DIASTOLIC BLOOD PRESSURE: 58 MMHG | SYSTOLIC BLOOD PRESSURE: 131 MMHG

## 2017-09-19 VITALS — DIASTOLIC BLOOD PRESSURE: 94 MMHG | SYSTOLIC BLOOD PRESSURE: 195 MMHG

## 2017-09-19 VITALS — SYSTOLIC BLOOD PRESSURE: 198 MMHG | DIASTOLIC BLOOD PRESSURE: 75 MMHG

## 2017-09-19 VITALS — SYSTOLIC BLOOD PRESSURE: 150 MMHG | DIASTOLIC BLOOD PRESSURE: 75 MMHG

## 2017-09-19 LAB
ANION GAP SERPL CALCULATED.3IONS-SCNC: 13 MMOL/L
BASOPHILS NFR BLD AUTO: 0 % (ref 0–3)
BUN SERPL-MCNC: 20 MG/DL (ref 7–17)
BUN/CREAT SERPL: 12
C DIFF TOX A+B STL QL: NEGATIVE
CALCIUM SERPL-MCNC: 8.7 MG/DL (ref 8.4–10.2)
CHLORIDE SERPL-SCNC: 115 MMOL/L (ref 95–108)
CO2 SERPL-SCNC: 17 MMOL/L (ref 22–30)
CREAT SERPL-MCNC: 1.6 MG/DL (ref 0.5–1)
EOSINOPHIL NFR BLD AUTO: 4 % (ref 0–8)
ERYTHROCYTE [DISTWIDTH] IN BLOOD BY AUTOMATED COUNT: 14 % (ref 11.5–15.5)
GLUCOSE SERPL-MCNC: 113 MG/DL (ref 65–105)
HCT VFR BLD AUTO: 26.6 % (ref 37–47)
HGB BLD-MCNC: 9.8 G/DL (ref 12–16)
IMM GRANULOCYTES NFR BLD: 0.5 % (ref 0–1)
LYMPHOCYTES NFR BLD: 16 % (ref 15–41)
MAGNESIUM SERPL-MCNC: 1.7 MG/DL (ref 1.6–2.3)
MCH RBC QN AUTO: 29.7 PG  CALC (ref 26–32)
MCHC RBC AUTO-ENTMCNC: 36.8 G/L CALC (ref 32–36)
MCV RBC AUTO: 80.6 FL  CALC (ref 80–100)
MONOCYTES NFR BLD AUTO: 11 % (ref 2–13)
NEUTROPHILS # BLD AUTO: 5.85 THOU/UL (ref 2–7.15)
NEUTROPHILS NFR BLD AUTO: 68 % (ref 42–76)
PLATELET # BLD AUTO: 203 THOU/UL (ref 130–400)
POTASSIUM SERPL-SCNC: 3.3 MMOL/L (ref 3.5–5.1)
RBC # BLD AUTO: 3.3 MILL/UL (ref 4.2–5.6)
SODIUM SERPL-SCNC: 142 MMOL/L (ref 137–146)

## 2017-09-20 VITALS — SYSTOLIC BLOOD PRESSURE: 143 MMHG | DIASTOLIC BLOOD PRESSURE: 83 MMHG

## 2017-09-20 VITALS — DIASTOLIC BLOOD PRESSURE: 97 MMHG | SYSTOLIC BLOOD PRESSURE: 208 MMHG

## 2017-09-20 VITALS — SYSTOLIC BLOOD PRESSURE: 158 MMHG | DIASTOLIC BLOOD PRESSURE: 92 MMHG

## 2017-09-20 VITALS — DIASTOLIC BLOOD PRESSURE: 92 MMHG | SYSTOLIC BLOOD PRESSURE: 184 MMHG

## 2017-09-20 VITALS — DIASTOLIC BLOOD PRESSURE: 85 MMHG | SYSTOLIC BLOOD PRESSURE: 187 MMHG

## 2017-09-20 VITALS — DIASTOLIC BLOOD PRESSURE: 69 MMHG | SYSTOLIC BLOOD PRESSURE: 178 MMHG

## 2017-09-20 VITALS — DIASTOLIC BLOOD PRESSURE: 76 MMHG | SYSTOLIC BLOOD PRESSURE: 171 MMHG

## 2017-09-20 LAB
ANION GAP SERPL CALCULATED.3IONS-SCNC: 12 MMOL/L
BASOPHILS NFR BLD AUTO: 1 % (ref 0–3)
BUN SERPL-MCNC: 18 MG/DL (ref 7–17)
BUN/CREAT SERPL: 12
CALCIUM SERPL-MCNC: 9.1 MG/DL (ref 8.4–10.2)
CHLORIDE SERPL-SCNC: 117 MMOL/L (ref 95–108)
CO2 SERPL-SCNC: 18 MMOL/L (ref 22–30)
CREAT SERPL-MCNC: 1.5 MG/DL (ref 0.5–1)
EOSINOPHIL NFR BLD AUTO: 6 % (ref 0–8)
ERYTHROCYTE [DISTWIDTH] IN BLOOD BY AUTOMATED COUNT: 13.9 % (ref 11.5–15.5)
GLUCOSE SERPL-MCNC: 102 MG/DL (ref 65–105)
HCT VFR BLD AUTO: 27.7 % (ref 37–47)
HGB BLD-MCNC: 9.9 G/DL (ref 12–16)
IMM GRANULOCYTES NFR BLD: 0.6 % (ref 0–1)
LYMPHOCYTES NFR BLD: 20 % (ref 15–41)
MCH RBC QN AUTO: 28.9 PG  CALC (ref 26–32)
MCHC RBC AUTO-ENTMCNC: 35.7 G/L CALC (ref 32–36)
MCV RBC AUTO: 81 FL  CALC (ref 80–100)
MONOCYTES NFR BLD AUTO: 12 % (ref 2–13)
NEUTROPHILS # BLD AUTO: 5.12 THOU/UL (ref 2–7.15)
NEUTROPHILS NFR BLD AUTO: 62 % (ref 42–76)
PLATELET # BLD AUTO: 200 THOU/UL (ref 130–400)
POTASSIUM SERPL-SCNC: 3.7 MMOL/L (ref 3.5–5.1)
RBC # BLD AUTO: 3.42 MILL/UL (ref 4.2–5.6)
SODIUM SERPL-SCNC: 143 MMOL/L (ref 137–146)

## 2017-09-21 VITALS — DIASTOLIC BLOOD PRESSURE: 89 MMHG | SYSTOLIC BLOOD PRESSURE: 189 MMHG

## 2017-09-21 VITALS — SYSTOLIC BLOOD PRESSURE: 180 MMHG | DIASTOLIC BLOOD PRESSURE: 74 MMHG

## 2017-09-21 VITALS — SYSTOLIC BLOOD PRESSURE: 157 MMHG | DIASTOLIC BLOOD PRESSURE: 82 MMHG

## 2017-09-21 VITALS — DIASTOLIC BLOOD PRESSURE: 94 MMHG | SYSTOLIC BLOOD PRESSURE: 199 MMHG

## 2017-09-21 VITALS — DIASTOLIC BLOOD PRESSURE: 90 MMHG | SYSTOLIC BLOOD PRESSURE: 189 MMHG

## 2017-09-21 LAB
ANION GAP SERPL CALCULATED.3IONS-SCNC: 14 MMOL/L
BACTERIA #/AREA URNS HPF: (no result) HPF
BASOPHILS NFR BLD AUTO: 1 % (ref 0–3)
BILIRUB UR QL STRIP.AUTO: (no result)
BUN SERPL-MCNC: 16 MG/DL (ref 7–17)
BUN/CREAT SERPL: 13
CALCIUM SERPL-MCNC: 9 MG/DL (ref 8.4–10.2)
CHLORIDE SERPL-SCNC: 114 MMOL/L (ref 95–108)
CHOLEST SERPL-MCNC: 148 MG/DL (ref 0–199)
CHOLEST/HDLC SERPL: 3.1 {RATIO}
CLARITY UR: (no result)
CO2 SERPL-SCNC: 17 MMOL/L (ref 22–30)
COLOR UR AUTO: (no result)
CREAT SERPL-MCNC: 1.3 MG/DL (ref 0.5–1)
EOSINOPHIL NFR BLD AUTO: 6 % (ref 0–8)
ERYTHROCYTE [DISTWIDTH] IN BLOOD BY AUTOMATED COUNT: 14 % (ref 11.5–15.5)
GLUCOSE SERPL-MCNC: 108 MG/DL (ref 65–105)
GLUCOSE UR STRIP.AUTO-MCNC: NEGATIVE MG/DL
HCT VFR BLD AUTO: 28 % (ref 37–47)
HDLC SERPL-MCNC: 47 MG/DL (ref 40–?)
HGB BLD-MCNC: 10.3 G/DL (ref 12–16)
HGB UR QL STRIP.AUTO: (no result)
IMM GRANULOCYTES NFR BLD: 0.8 % (ref 0–1)
KETONES UR STRIP.AUTO-MCNC: (no result) MG/DL
LDLC SERPL CALC-MCNC: 69 MG/DL
LEUKOCYTE ESTERASE UR QL STRIP.AUTO: (no result)
LYMPHOCYTES NFR BLD: 22 % (ref 15–41)
MAGNESIUM SERPL-MCNC: 1.8 MG/DL (ref 1.6–2.3)
MCH RBC QN AUTO: 29.6 PG  CALC (ref 26–32)
MCHC RBC AUTO-ENTMCNC: 36.8 G/L CALC (ref 32–36)
MCV RBC AUTO: 80.5 FL  CALC (ref 80–100)
MONOCYTES NFR BLD AUTO: 12 % (ref 2–13)
NEUTROPHILS # BLD AUTO: 4.49 THOU/UL (ref 2–7.15)
NEUTROPHILS NFR BLD AUTO: 59 % (ref 42–76)
NITRITE UR QL STRIP.AUTO: POSITIVE
PH UR STRIP.AUTO: 5 [PH] (ref 4.5–8)
PLATELET # BLD AUTO: 218 THOU/UL (ref 130–400)
POTASSIUM SERPL-SCNC: 3.5 MMOL/L (ref 3.5–5.1)
PROT UR QL STRIP.AUTO: 100 MG/DL
RBC # BLD AUTO: 3.48 MILL/UL (ref 4.2–5.6)
RBC #/AREA URNS HPF: (no result) RBC/HPF (ref 0–5)
SODIUM SERPL-SCNC: 141 MMOL/L (ref 137–146)
SP GR UR STRIP.AUTO: 1.02
TRIGL SERPL-MCNC: 163 MG/DL (ref 30–149)
UROBILINOGEN UR QL STRIP.AUTO: 1 E.U./DL
VLDLC SERPL CALC-MCNC: 33 MG/DL
WBC #/AREA URNS HPF: (no result) WBC/HPF (ref 0–5)

## 2017-09-22 VITALS — DIASTOLIC BLOOD PRESSURE: 84 MMHG | SYSTOLIC BLOOD PRESSURE: 175 MMHG

## 2017-09-22 VITALS — SYSTOLIC BLOOD PRESSURE: 218 MMHG | DIASTOLIC BLOOD PRESSURE: 97 MMHG

## 2017-09-22 VITALS — SYSTOLIC BLOOD PRESSURE: 163 MMHG | DIASTOLIC BLOOD PRESSURE: 86 MMHG

## 2017-09-22 VITALS — SYSTOLIC BLOOD PRESSURE: 186 MMHG | DIASTOLIC BLOOD PRESSURE: 85 MMHG

## 2017-09-22 VITALS — DIASTOLIC BLOOD PRESSURE: 82 MMHG | SYSTOLIC BLOOD PRESSURE: 168 MMHG

## 2017-09-22 VITALS — SYSTOLIC BLOOD PRESSURE: 226 MMHG | DIASTOLIC BLOOD PRESSURE: 99 MMHG

## 2017-09-22 VITALS — DIASTOLIC BLOOD PRESSURE: 96 MMHG | SYSTOLIC BLOOD PRESSURE: 205 MMHG

## 2017-09-22 LAB
ANION GAP SERPL CALCULATED.3IONS-SCNC: 17 MMOL/L
BASOPHILS NFR BLD AUTO: 1 % (ref 0–3)
BUN SERPL-MCNC: 15 MG/DL (ref 7–17)
BUN/CREAT SERPL: 13
CALCIUM SERPL-MCNC: 9.1 MG/DL (ref 8.4–10.2)
CHLORIDE SERPL-SCNC: 113 MMOL/L (ref 95–108)
CO2 SERPL-SCNC: 17 MMOL/L (ref 22–30)
CREAT SERPL-MCNC: 1.1 MG/DL (ref 0.5–1)
EOSINOPHIL NFR BLD AUTO: 6 % (ref 0–8)
ERYTHROCYTE [DISTWIDTH] IN BLOOD BY AUTOMATED COUNT: 13.9 % (ref 11.5–15.5)
GLUCOSE SERPL-MCNC: 82 MG/DL (ref 65–105)
HCT VFR BLD AUTO: 27.3 % (ref 37–47)
HGB BLD-MCNC: 10.1 G/DL (ref 12–16)
IMM GRANULOCYTES NFR BLD: 0.7 % (ref 0–1)
LYMPHOCYTES NFR BLD: 21 % (ref 15–41)
MAGNESIUM SERPL-MCNC: 1.7 MG/DL (ref 1.6–2.3)
MCH RBC QN AUTO: 29.4 PG  CALC (ref 26–32)
MCHC RBC AUTO-ENTMCNC: 37 G/L CALC (ref 32–36)
MCV RBC AUTO: 79.6 FL  CALC (ref 80–100)
MONOCYTES NFR BLD AUTO: 10 % (ref 2–13)
NEUTROPHILS # BLD AUTO: 4.67 THOU/UL (ref 2–7.15)
NEUTROPHILS NFR BLD AUTO: 62 % (ref 42–76)
PLATELET # BLD AUTO: 231 THOU/UL (ref 130–400)
POTASSIUM SERPL-SCNC: 3.7 MMOL/L (ref 3.5–5.1)
RBC # BLD AUTO: 3.43 MILL/UL (ref 4.2–5.6)
SODIUM SERPL-SCNC: 143 MMOL/L (ref 137–146)

## 2017-11-01 ENCOUNTER — HOSPITAL ENCOUNTER (EMERGENCY)
Dept: HOSPITAL 82 - ED | Age: 62
Discharge: HOME | DRG: 556 | End: 2017-11-01
Payer: COMMERCIAL

## 2017-11-01 VITALS — HEIGHT: 62 IN | BODY MASS INDEX: 26.37 KG/M2 | WEIGHT: 143.3 LBS

## 2017-11-01 VITALS — DIASTOLIC BLOOD PRESSURE: 106 MMHG | SYSTOLIC BLOOD PRESSURE: 160 MMHG

## 2017-11-01 DIAGNOSIS — M25.572: Primary | ICD-10-CM

## 2017-11-01 DIAGNOSIS — M20.12: ICD-10-CM

## 2017-11-01 DIAGNOSIS — M77.32: ICD-10-CM

## 2017-11-01 DIAGNOSIS — R22.42: ICD-10-CM

## 2017-11-01 LAB
ALBUMIN SERPL-MCNC: 4.9 G/DL (ref 3.2–5)
ALP SERPL-CCNC: 80 U/L (ref 38–126)
ALT SERPL-CCNC: 74 U/L (ref 9–52)
ANION GAP SERPL CALCULATED.3IONS-SCNC: 19 MMOL/L
AST SERPL-CCNC: 53 U/L (ref 14–36)
BUN SERPL-MCNC: 21 MG/DL (ref 7–17)
BUN/CREAT SERPL: 14
CALCIUM SERPL-MCNC: 10.2 MG/DL (ref 8.4–10.2)
CHLORIDE SERPL-SCNC: 106 MMOL/L (ref 95–108)
CO2 SERPL-SCNC: 26 MMOL/L (ref 22–30)
CREAT SERPL-MCNC: 1.5 MG/DL (ref 0.5–1)
GLUCOSE SERPL-MCNC: 101 MG/DL (ref 65–105)
POTASSIUM SERPL-SCNC: 4.8 MMOL/L (ref 3.5–5.1)
PROT SERPL-MCNC: 8.9 G/DL (ref 6.3–8.2)
SODIUM SERPL-SCNC: 146 MMOL/L (ref 137–146)

## 2018-04-12 ENCOUNTER — HOSPITAL ENCOUNTER (EMERGENCY)
Dept: HOSPITAL 82 - ED | Age: 63
Discharge: ANOTHER HOSPITAL | DRG: 311 | End: 2018-04-12
Payer: COMMERCIAL

## 2018-04-12 VITALS — DIASTOLIC BLOOD PRESSURE: 89 MMHG | SYSTOLIC BLOOD PRESSURE: 222 MMHG

## 2018-04-12 VITALS — BODY MASS INDEX: 25.8 KG/M2 | WEIGHT: 140.21 LBS | HEIGHT: 62 IN

## 2018-04-12 DIAGNOSIS — I10: ICD-10-CM

## 2018-04-12 DIAGNOSIS — R06.02: ICD-10-CM

## 2018-04-12 DIAGNOSIS — F17.210: ICD-10-CM

## 2018-04-12 DIAGNOSIS — R11.0: ICD-10-CM

## 2018-04-12 DIAGNOSIS — R07.9: ICD-10-CM

## 2018-04-12 DIAGNOSIS — I20.0: Primary | ICD-10-CM

## 2018-04-12 LAB
ALBUMIN SERPL-MCNC: 4.4 G/DL (ref 3.2–5)
ALP SERPL-CCNC: 102 U/L (ref 38–126)
ALT SERPL-CCNC: 49 U/L (ref 11–66)
ANION GAP SERPL CALCULATED.3IONS-SCNC: 20 MMOL/L
APTT PPP: 25 SECONDS (ref 20–32.5)
AST SERPL-CCNC: 51 U/L (ref 9–36)
BASOPHILS NFR BLD AUTO: 1 % (ref 0–3)
BUN SERPL-MCNC: 24 MG/DL (ref 8–23)
BUN/CREAT SERPL: 12
CHLORIDE SERPL-SCNC: 107 MMOL/L (ref 95–108)
CO2 SERPL-SCNC: 21 MMOL/L (ref 22–30)
CREAT SERPL-MCNC: 1.9 MG/DL (ref 0.5–1)
D DIMER PPP FEU-MCNC: 1.1 MG/L (ref 0.19–0.6)
EOSINOPHIL NFR BLD AUTO: 4 % (ref 0–8)
ERYTHROCYTE [DISTWIDTH] IN BLOOD BY AUTOMATED COUNT: 14.1 % (ref 11.5–15.5)
HCT VFR BLD AUTO: 38.8 % (ref 37–47)
HGB BLD-MCNC: 14 G/DL (ref 12–16)
IMM GRANULOCYTES NFR BLD: 0.2 % (ref 0–1)
INR PPP: 1 RATIO (ref 0.7–1.3)
LYMPHOCYTES NFR BLD: 39 % (ref 15–41)
MCH RBC QN AUTO: 29.7 PG  CALC (ref 26–32)
MCHC RBC AUTO-ENTMCNC: 36.1 G/L CALC (ref 32–36)
MCV RBC AUTO: 82.2 FL  CALC (ref 80–100)
MONOCYTES NFR BLD AUTO: 11 % (ref 2–13)
NEUTROPHILS # BLD AUTO: 3.68 THOU/UL (ref 2–7.15)
NEUTROPHILS NFR BLD AUTO: 44 % (ref 42–76)
PLATELET # BLD AUTO: 294 THOU/UL (ref 130–400)
POTASSIUM SERPL-SCNC: 3.5 MMOL/L (ref 3.5–5.1)
PROT SERPL-MCNC: 9.1 G/DL (ref 6.3–8.2)
PROTHROMBIN TIME: 11.1 SECONDS (ref 9–12.5)
RBC # BLD AUTO: 4.72 MILL/UL (ref 4.2–5.6)
SODIUM SERPL-SCNC: 144 MMOL/L (ref 137–146)

## 2018-04-19 ENCOUNTER — HOSPITAL ENCOUNTER (EMERGENCY)
Dept: HOSPITAL 82 - ED | Age: 63
Discharge: HOME | DRG: 305 | End: 2018-04-19
Payer: COMMERCIAL

## 2018-04-19 VITALS — BODY MASS INDEX: 28.4 KG/M2 | HEIGHT: 62 IN | WEIGHT: 154.32 LBS

## 2018-04-19 VITALS — SYSTOLIC BLOOD PRESSURE: 195 MMHG | DIASTOLIC BLOOD PRESSURE: 86 MMHG

## 2018-04-19 DIAGNOSIS — I10: Primary | ICD-10-CM

## 2018-04-19 DIAGNOSIS — R04.0: ICD-10-CM

## 2018-04-19 DIAGNOSIS — M10.9: ICD-10-CM

## 2018-04-19 DIAGNOSIS — Z95.5: ICD-10-CM

## 2018-04-19 DIAGNOSIS — E11.9: ICD-10-CM

## 2018-04-19 LAB
ALBUMIN SERPL-MCNC: 3.9 G/DL (ref 3.2–5)
ALP SERPL-CCNC: 69 U/L (ref 38–126)
ALT SERPL-CCNC: 37 U/L (ref 11–66)
ANION GAP SERPL CALCULATED.3IONS-SCNC: 20 MMOL/L
AST SERPL-CCNC: 27 U/L (ref 9–36)
BASOPHILS NFR BLD AUTO: 1 % (ref 0–3)
BUN SERPL-MCNC: 26 MG/DL (ref 8–23)
BUN/CREAT SERPL: 11
CHLORIDE SERPL-SCNC: 107 MMOL/L (ref 95–108)
CO2 SERPL-SCNC: 19 MMOL/L (ref 22–30)
CREAT SERPL-MCNC: 2.3 MG/DL (ref 0.5–1)
EOSINOPHIL NFR BLD AUTO: 2 % (ref 0–8)
ERYTHROCYTE [DISTWIDTH] IN BLOOD BY AUTOMATED COUNT: 13.4 % (ref 11.5–15.5)
HCT VFR BLD AUTO: 34.6 % (ref 37–47)
HGB BLD-MCNC: 12.3 G/DL (ref 12–16)
IMM GRANULOCYTES NFR BLD: 0.4 % (ref 0–1)
LYMPHOCYTES NFR BLD: 15 % (ref 15–41)
MCH RBC QN AUTO: 28.8 PG  CALC (ref 26–32)
MCHC RBC AUTO-ENTMCNC: 35.5 G/L CALC (ref 32–36)
MCV RBC AUTO: 81 FL  CALC (ref 80–100)
MONOCYTES NFR BLD AUTO: 10 % (ref 2–13)
NEUTROPHILS # BLD AUTO: 6.55 THOU/UL (ref 2–7.15)
NEUTROPHILS NFR BLD AUTO: 72 % (ref 42–76)
PLATELET # BLD AUTO: 316 THOU/UL (ref 130–400)
POTASSIUM SERPL-SCNC: 4.4 MMOL/L (ref 3.5–5.1)
PROT SERPL-MCNC: 8.2 G/DL (ref 6.3–8.2)
RBC # BLD AUTO: 4.27 MILL/UL (ref 4.2–5.6)
SODIUM SERPL-SCNC: 142 MMOL/L (ref 137–146)

## 2018-04-27 ENCOUNTER — HOSPITAL ENCOUNTER (EMERGENCY)
Dept: HOSPITAL 82 - ED | Age: 63
Discharge: ANOTHER HOSPITAL | DRG: 301 | End: 2018-04-27
Payer: COMMERCIAL

## 2018-04-27 VITALS — HEIGHT: 62 IN | WEIGHT: 134.26 LBS | BODY MASS INDEX: 24.71 KG/M2

## 2018-04-27 VITALS — DIASTOLIC BLOOD PRESSURE: 91 MMHG | SYSTOLIC BLOOD PRESSURE: 195 MMHG

## 2018-04-27 DIAGNOSIS — I10: ICD-10-CM

## 2018-04-27 DIAGNOSIS — E11.9: ICD-10-CM

## 2018-04-27 DIAGNOSIS — M10.9: ICD-10-CM

## 2018-04-27 DIAGNOSIS — I77.1: Primary | ICD-10-CM

## 2018-04-27 LAB
ALBUMIN SERPL-MCNC: 3.6 G/DL (ref 3.2–5)
ALP SERPL-CCNC: 113 U/L (ref 38–126)
ALT SERPL-CCNC: 39 U/L (ref 11–66)
ANION GAP SERPL CALCULATED.3IONS-SCNC: 20 MMOL/L
AST SERPL-CCNC: 32 U/L (ref 9–36)
BASOPHILS NFR BLD AUTO: 1 % (ref 0–3)
BUN SERPL-MCNC: 31 MG/DL (ref 8–23)
BUN/CREAT SERPL: 13
CHLORIDE SERPL-SCNC: 109 MMOL/L (ref 95–108)
CO2 SERPL-SCNC: 16 MMOL/L (ref 22–30)
CREAT SERPL-MCNC: 2.4 MG/DL (ref 0.5–1)
EOSINOPHIL NFR BLD AUTO: 3 % (ref 0–8)
ERYTHROCYTE [DISTWIDTH] IN BLOOD BY AUTOMATED COUNT: 13.8 % (ref 11.5–15.5)
HCT VFR BLD AUTO: 27.7 % (ref 37–47)
HGB BLD-MCNC: 10 G/DL (ref 12–16)
IMM GRANULOCYTES NFR BLD: 0.7 % (ref 0–1)
INR PPP: 1 RATIO (ref 0.7–1.3)
LYMPHOCYTES NFR BLD: 30 % (ref 15–41)
MCH RBC QN AUTO: 29.1 PG  CALC (ref 26–32)
MCHC RBC AUTO-ENTMCNC: 36.1 G/L CALC (ref 32–36)
MCV RBC AUTO: 80.5 FL  CALC (ref 80–100)
MONOCYTES NFR BLD AUTO: 9 % (ref 2–13)
NEUTROPHILS # BLD AUTO: 4.26 THOU/UL (ref 2–7.15)
NEUTROPHILS NFR BLD AUTO: 57 % (ref 42–76)
PLATELET # BLD AUTO: 414 THOU/UL (ref 130–400)
POTASSIUM SERPL-SCNC: 4.8 MMOL/L (ref 3.5–5.1)
PROT SERPL-MCNC: 7.8 G/DL (ref 6.3–8.2)
PROTHROMBIN TIME: 11.4 SECONDS (ref 9–12.5)
RBC # BLD AUTO: 3.44 MILL/UL (ref 4.2–5.6)
SODIUM SERPL-SCNC: 142 MMOL/L (ref 137–146)

## 2018-05-13 ENCOUNTER — HOSPITAL ENCOUNTER (EMERGENCY)
Dept: HOSPITAL 82 - ED | Age: 63
Discharge: HOME | DRG: 556 | End: 2018-05-13
Payer: COMMERCIAL

## 2018-05-13 VITALS — SYSTOLIC BLOOD PRESSURE: 144 MMHG | DIASTOLIC BLOOD PRESSURE: 71 MMHG

## 2018-05-13 VITALS — WEIGHT: 150.36 LBS | BODY MASS INDEX: 27.67 KG/M2 | HEIGHT: 62 IN
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
## 2018-05-13 DIAGNOSIS — Z86.718: ICD-10-CM
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
## 2018-05-13 DIAGNOSIS — M79.605: Primary | ICD-10-CM
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
## 2018-06-29 ENCOUNTER — HOSPITAL ENCOUNTER (EMERGENCY)
Dept: HOSPITAL 82 - ED | Age: 63
LOS: 1 days | Discharge: ANOTHER HOSPITAL | DRG: 316 | End: 2018-06-30
Payer: COMMERCIAL

## 2018-06-29 VITALS — BODY MASS INDEX: 28.4 KG/M2 | HEIGHT: 62 IN | WEIGHT: 154.32 LBS

## 2018-06-29 DIAGNOSIS — M10.9: ICD-10-CM

## 2018-06-29 DIAGNOSIS — T82.898A: Primary | ICD-10-CM
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
## 2018-06-29 DIAGNOSIS — E11.9: ICD-10-CM

## 2018-06-29 DIAGNOSIS — I10: ICD-10-CM

## 2018-06-29 LAB
ALBUMIN SERPL-MCNC: 3.1 G/DL (ref 3.2–5)
ALP SERPL-CCNC: 104 U/L (ref 38–126)
ALT SERPL-CCNC: 42 U/L (ref 11–66)
AMYLASE SERPL-CCNC: 116 U/L (ref 30–110)
ANION GAP SERPL CALCULATED.3IONS-SCNC: 13 MMOL/L
AST SERPL-CCNC: 37 U/L (ref 9–36)
BASOPHILS NFR BLD AUTO: 0 % (ref 0–3)
BUN SERPL-MCNC: 37 MG/DL (ref 8–23)
BUN/CREAT SERPL: 20
CHLORIDE SERPL-SCNC: 106 MMOL/L (ref 95–108)
CO2 SERPL-SCNC: 24 MMOL/L (ref 22–30)
CREAT SERPL-MCNC: 1.8 MG/DL (ref 0.5–1)
EOSINOPHIL NFR BLD AUTO: 3 % (ref 0–8)
ERYTHROCYTE [DISTWIDTH] IN BLOOD BY AUTOMATED COUNT: 14.2 % (ref 11.5–15.5)
HCT VFR BLD AUTO: 17.4 % (ref 37–47)
HGB BLD-MCNC: 6 G/DL (ref 12–16)
IMM GRANULOCYTES NFR BLD: 0.5 % (ref 0–1)
LIPASE SERPL-CCNC: 75 U/L (ref 23–300)
LYMPHOCYTES NFR BLD: 18 % (ref 15–41)
MCH RBC QN AUTO: 30.6 PG  CALC (ref 26–32)
MCHC RBC AUTO-ENTMCNC: 34.5 G/L CALC (ref 32–36)
MCV RBC AUTO: 88.8 FL  CALC (ref 80–100)
MONOCYTES NFR BLD AUTO: 14 % (ref 2–13)
NEUTROPHILS # BLD AUTO: 5.32 THOU/UL (ref 2–7.15)
NEUTROPHILS NFR BLD AUTO: 65 % (ref 42–76)
PLATELET # BLD AUTO: 264 THOU/UL (ref 130–400)
POTASSIUM SERPL-SCNC: 3.7 MMOL/L (ref 3.5–5.1)
PROT SERPL-MCNC: 6.6 G/DL (ref 6.3–8.2)
RBC # BLD AUTO: 1.96 MILL/UL (ref 4.2–5.6)
SODIUM SERPL-SCNC: 139 MMOL/L (ref 137–146)

## 2018-06-30 VITALS — DIASTOLIC BLOOD PRESSURE: 77 MMHG | SYSTOLIC BLOOD PRESSURE: 170 MMHG

## 2018-07-04 ENCOUNTER — HOSPITAL ENCOUNTER (EMERGENCY)
Dept: HOSPITAL 82 - ED | Age: 63
Discharge: ANOTHER HOSPITAL | DRG: 379 | End: 2018-07-04
Payer: COMMERCIAL

## 2018-07-04 VITALS — DIASTOLIC BLOOD PRESSURE: 76 MMHG | SYSTOLIC BLOOD PRESSURE: 173 MMHG

## 2018-07-04 VITALS — DIASTOLIC BLOOD PRESSURE: 75 MMHG | SYSTOLIC BLOOD PRESSURE: 160 MMHG

## 2018-07-04 VITALS — WEIGHT: 119.05 LBS | HEIGHT: 62 IN | BODY MASS INDEX: 21.91 KG/M2

## 2018-07-04 VITALS — SYSTOLIC BLOOD PRESSURE: 173 MMHG | DIASTOLIC BLOOD PRESSURE: 76 MMHG

## 2018-07-04 VITALS — DIASTOLIC BLOOD PRESSURE: 73 MMHG | SYSTOLIC BLOOD PRESSURE: 164 MMHG

## 2018-07-04 DIAGNOSIS — E11.9: ICD-10-CM

## 2018-07-04 DIAGNOSIS — Z86.718: ICD-10-CM

## 2018-07-04 DIAGNOSIS — I10: ICD-10-CM

## 2018-07-04 DIAGNOSIS — Z95.828: ICD-10-CM

## 2018-07-04 DIAGNOSIS — K62.5: Primary | ICD-10-CM

## 2018-07-04 LAB
ALBUMIN SERPL-MCNC: 3.2 G/DL (ref 3.2–5)
ALP SERPL-CCNC: 82 U/L (ref 38–126)
ALT SERPL-CCNC: 39 U/L (ref 11–66)
ANION GAP SERPL CALCULATED.3IONS-SCNC: 19 MMOL/L
AST SERPL-CCNC: 34 U/L (ref 9–36)
BASOPHILS NFR BLD AUTO: 0 % (ref 0–3)
BUN SERPL-MCNC: 47 MG/DL (ref 8–23)
BUN/CREAT SERPL: 28
CHLORIDE SERPL-SCNC: 107 MMOL/L (ref 95–108)
CO2 SERPL-SCNC: 18 MMOL/L (ref 22–30)
CREAT SERPL-MCNC: 1.7 MG/DL (ref 0.5–1)
EOSINOPHIL NFR BLD AUTO: 1 % (ref 0–8)
ERYTHROCYTE [DISTWIDTH] IN BLOOD BY AUTOMATED COUNT: 14.2 % (ref 11.5–15.5)
HCT VFR BLD AUTO: 17.1 % (ref 37–47)
HGB BLD-MCNC: 5.9 G/DL (ref 12–16)
IMM GRANULOCYTES NFR BLD: 0.4 % (ref 0–1)
INR PPP: 1 RATIO (ref 0.7–1.3)
LYMPHOCYTES NFR BLD: 19 % (ref 15–41)
MCH RBC QN AUTO: 31.1 PG  CALC (ref 26–32)
MCHC RBC AUTO-ENTMCNC: 34.5 G/L CALC (ref 32–36)
MCV RBC AUTO: 90 FL  CALC (ref 80–100)
MONOCYTES NFR BLD AUTO: 8 % (ref 2–13)
MYOGLOBIN SERPL-MCNC: 280 NG/ML (ref 0–62)
NEUTROPHILS # BLD AUTO: 6.96 THOU/UL (ref 2–7.15)
NEUTROPHILS NFR BLD AUTO: 71 % (ref 42–76)
PLATELET # BLD AUTO: 324 THOU/UL (ref 130–400)
POTASSIUM SERPL-SCNC: 4.1 MMOL/L (ref 3.5–5.1)
PROT SERPL-MCNC: 6.9 G/DL (ref 6.3–8.2)
PROTHROMBIN TIME: 11.5 SECONDS (ref 9–12.5)
RBC # BLD AUTO: 1.9 MILL/UL (ref 4.2–5.6)
SODIUM SERPL-SCNC: 140 MMOL/L (ref 137–146)

## 2018-07-04 PROCEDURE — P9016 RBC LEUKOCYTES REDUCED: HCPCS

## 2018-07-04 PROCEDURE — S0164 INJECTION PANTROPRAZOLE: HCPCS

## 2018-07-04 PROCEDURE — 30233N1 TRANSFUSION OF NONAUTOLOGOUS RED BLOOD CELLS INTO PERIPHERAL VEIN, PERCUTANEOUS APPROACH: ICD-10-PCS | Performed by: EMERGENCY MEDICINE

## 2018-07-25 ENCOUNTER — HOSPITAL ENCOUNTER (EMERGENCY)
Dept: HOSPITAL 82 - ED | Age: 63
Discharge: HOME | End: 2018-07-25
Payer: COMMERCIAL

## 2018-07-25 VITALS — HEIGHT: 62 IN | WEIGHT: 121.25 LBS | BODY MASS INDEX: 22.31 KG/M2

## 2018-07-25 VITALS — SYSTOLIC BLOOD PRESSURE: 169 MMHG | DIASTOLIC BLOOD PRESSURE: 98 MMHG

## 2018-07-25 DIAGNOSIS — R06.02: ICD-10-CM

## 2018-07-25 DIAGNOSIS — R09.1: ICD-10-CM

## 2018-07-25 DIAGNOSIS — J18.9: Primary | ICD-10-CM

## 2018-07-25 LAB
ALBUMIN SERPL-MCNC: 3.6 G/DL (ref 3.2–5)
ALP SERPL-CCNC: 157 U/L (ref 38–126)
ALT SERPL-CCNC: 44 U/L (ref 11–66)
ANION GAP SERPL CALCULATED.3IONS-SCNC: 18 MMOL/L
AST SERPL-CCNC: 37 U/L (ref 9–36)
BASOPHILS NFR BLD AUTO: 1 % (ref 0–3)
BUN SERPL-MCNC: 22 MG/DL (ref 8–23)
BUN/CREAT SERPL: 17
CHLORIDE SERPL-SCNC: 107 MMOL/L (ref 95–108)
CO2 SERPL-SCNC: 19 MMOL/L (ref 22–30)
CREAT SERPL-MCNC: 1.3 MG/DL (ref 0.5–1)
EOSINOPHIL NFR BLD AUTO: 1 % (ref 0–8)
ERYTHROCYTE [DISTWIDTH] IN BLOOD BY AUTOMATED COUNT: 13.7 % (ref 11.5–15.5)
HCT VFR BLD AUTO: 31.8 % (ref 37–47)
HGB BLD-MCNC: 10.8 G/DL (ref 12–16)
IMM GRANULOCYTES NFR BLD: 0.3 % (ref 0–5)
LYMPHOCYTES NFR BLD: 10 % (ref 15–41)
MCH RBC QN AUTO: 29.8 PG  CALC (ref 26–32)
MCHC RBC AUTO-ENTMCNC: 34 G/L CALC (ref 32–36)
MCV RBC AUTO: 87.8 FL  CALC (ref 80–100)
MONOCYTES NFR BLD AUTO: 7 % (ref 2–13)
MYOGLOBIN SERPL-MCNC: 60 NG/ML (ref 0–62)
NEUTROPHILS # BLD AUTO: 10.5 THOU/UL (ref 2–7.15)
NEUTROPHILS NFR BLD AUTO: 81 % (ref 42–76)
PLATELET # BLD AUTO: 351 THOU/UL (ref 130–400)
POTASSIUM SERPL-SCNC: 4.5 MMOL/L (ref 3.5–5.1)
PROT SERPL-MCNC: 8 G/DL (ref 6.3–8.2)
RBC # BLD AUTO: 3.62 MILL/UL (ref 4.2–5.6)
SODIUM SERPL-SCNC: 140 MMOL/L (ref 137–146)

## 2018-08-02 ENCOUNTER — HOSPITAL ENCOUNTER (EMERGENCY)
Dept: HOSPITAL 82 - ED | Age: 63
Discharge: ANOTHER HOSPITAL | End: 2018-08-02
Payer: COMMERCIAL

## 2018-08-02 VITALS — BODY MASS INDEX: 24.34 KG/M2 | WEIGHT: 132.28 LBS | HEIGHT: 62 IN

## 2018-08-02 VITALS — SYSTOLIC BLOOD PRESSURE: 203 MMHG | DIASTOLIC BLOOD PRESSURE: 89 MMHG

## 2018-08-02 DIAGNOSIS — R06.02: ICD-10-CM

## 2018-08-02 DIAGNOSIS — J90: ICD-10-CM

## 2018-08-02 DIAGNOSIS — D64.9: ICD-10-CM

## 2018-08-02 DIAGNOSIS — E11.9: ICD-10-CM

## 2018-08-02 DIAGNOSIS — N28.9: ICD-10-CM

## 2018-08-02 DIAGNOSIS — I11.0: Primary | ICD-10-CM

## 2018-08-02 LAB
ALBUMIN SERPL-MCNC: 3.9 G/DL (ref 3.2–5)
ALP SERPL-CCNC: 106 U/L (ref 38–126)
ALT SERPL-CCNC: 20 U/L (ref 11–66)
ANION GAP SERPL CALCULATED.3IONS-SCNC: 16 MMOL/L
AST SERPL-CCNC: 39 U/L (ref 9–36)
BASOPHILS NFR BLD AUTO: 1 % (ref 0–3)
BUN SERPL-MCNC: 15 MG/DL (ref 8–23)
BUN/CREAT SERPL: 11
CHLORIDE SERPL-SCNC: 112 MMOL/L (ref 95–108)
CO2 SERPL-SCNC: 19 MMOL/L (ref 22–30)
CREAT SERPL-MCNC: 1.4 MG/DL (ref 0.5–1)
D DIMER PPP FEU-MCNC: 1.16 MG/L (ref 0.19–0.6)
EOSINOPHIL NFR BLD AUTO: 2 % (ref 0–8)
ERYTHROCYTE [DISTWIDTH] IN BLOOD BY AUTOMATED COUNT: 14.2 % (ref 11.5–15.5)
HCT VFR BLD AUTO: 26.2 % (ref 37–47)
HGB BLD-MCNC: 8.9 G/DL (ref 12–16)
IMM GRANULOCYTES NFR BLD: 0.4 % (ref 0–5)
INR PPP: 1 RATIO (ref 0.7–1.3)
LYMPHOCYTES NFR BLD: 20 % (ref 15–41)
MCH RBC QN AUTO: 30.2 PG  CALC (ref 26–32)
MCHC RBC AUTO-ENTMCNC: 34 G/L CALC (ref 32–36)
MCV RBC AUTO: 88.8 FL  CALC (ref 80–100)
MONOCYTES NFR BLD AUTO: 9 % (ref 2–13)
NEUTROPHILS # BLD AUTO: 5.16 THOU/UL (ref 2–7.15)
NEUTROPHILS NFR BLD AUTO: 68 % (ref 42–76)
PLATELET # BLD AUTO: 341 THOU/UL (ref 130–400)
POTASSIUM SERPL-SCNC: 4.6 MMOL/L (ref 3.5–5.1)
PROT SERPL-MCNC: 8.6 G/DL (ref 6.3–8.2)
PROTHROMBIN TIME: 11.4 SECONDS (ref 9–12.5)
RBC # BLD AUTO: 2.95 MILL/UL (ref 4.2–5.6)
SODIUM SERPL-SCNC: 143 MMOL/L (ref 137–146)

## 2018-09-11 ENCOUNTER — HOSPITAL ENCOUNTER (INPATIENT)
Dept: HOSPITAL 82 - ED | Age: 63
LOS: 1 days | Discharge: LEFT BEFORE DISCHARGE | DRG: 304 | End: 2018-09-12
Attending: INTERNAL MEDICINE | Admitting: INTERNAL MEDICINE
Payer: COMMERCIAL

## 2018-09-11 VITALS — DIASTOLIC BLOOD PRESSURE: 71 MMHG | SYSTOLIC BLOOD PRESSURE: 162 MMHG

## 2018-09-11 VITALS — DIASTOLIC BLOOD PRESSURE: 76 MMHG | SYSTOLIC BLOOD PRESSURE: 162 MMHG

## 2018-09-11 VITALS — WEIGHT: 141 LBS | BODY MASS INDEX: 24.07 KG/M2 | HEIGHT: 64 IN

## 2018-09-11 VITALS — SYSTOLIC BLOOD PRESSURE: 163 MMHG | DIASTOLIC BLOOD PRESSURE: 76 MMHG

## 2018-09-11 VITALS — DIASTOLIC BLOOD PRESSURE: 77 MMHG | SYSTOLIC BLOOD PRESSURE: 166 MMHG

## 2018-09-11 VITALS — SYSTOLIC BLOOD PRESSURE: 170 MMHG | DIASTOLIC BLOOD PRESSURE: 75 MMHG

## 2018-09-11 VITALS — DIASTOLIC BLOOD PRESSURE: 126 MMHG | SYSTOLIC BLOOD PRESSURE: 229 MMHG

## 2018-09-11 VITALS — DIASTOLIC BLOOD PRESSURE: 92 MMHG | SYSTOLIC BLOOD PRESSURE: 220 MMHG

## 2018-09-11 VITALS — SYSTOLIC BLOOD PRESSURE: 218 MMHG | DIASTOLIC BLOOD PRESSURE: 148 MMHG

## 2018-09-11 VITALS — SYSTOLIC BLOOD PRESSURE: 165 MMHG | DIASTOLIC BLOOD PRESSURE: 74 MMHG

## 2018-09-11 VITALS — DIASTOLIC BLOOD PRESSURE: 94 MMHG | SYSTOLIC BLOOD PRESSURE: 202 MMHG

## 2018-09-11 VITALS — DIASTOLIC BLOOD PRESSURE: 75 MMHG | SYSTOLIC BLOOD PRESSURE: 175 MMHG

## 2018-09-11 VITALS — SYSTOLIC BLOOD PRESSURE: 188 MMHG | DIASTOLIC BLOOD PRESSURE: 83 MMHG

## 2018-09-11 VITALS — DIASTOLIC BLOOD PRESSURE: 74 MMHG | SYSTOLIC BLOOD PRESSURE: 176 MMHG

## 2018-09-11 VITALS — DIASTOLIC BLOOD PRESSURE: 75 MMHG | SYSTOLIC BLOOD PRESSURE: 163 MMHG

## 2018-09-11 VITALS — SYSTOLIC BLOOD PRESSURE: 167 MMHG | DIASTOLIC BLOOD PRESSURE: 72 MMHG

## 2018-09-11 VITALS — DIASTOLIC BLOOD PRESSURE: 76 MMHG | SYSTOLIC BLOOD PRESSURE: 177 MMHG

## 2018-09-11 VITALS — DIASTOLIC BLOOD PRESSURE: 90 MMHG | SYSTOLIC BLOOD PRESSURE: 222 MMHG

## 2018-09-11 VITALS — DIASTOLIC BLOOD PRESSURE: 78 MMHG | SYSTOLIC BLOOD PRESSURE: 184 MMHG

## 2018-09-11 VITALS — SYSTOLIC BLOOD PRESSURE: 159 MMHG | DIASTOLIC BLOOD PRESSURE: 73 MMHG

## 2018-09-11 VITALS — DIASTOLIC BLOOD PRESSURE: 74 MMHG | SYSTOLIC BLOOD PRESSURE: 161 MMHG

## 2018-09-11 VITALS — DIASTOLIC BLOOD PRESSURE: 74 MMHG | SYSTOLIC BLOOD PRESSURE: 165 MMHG

## 2018-09-11 VITALS — SYSTOLIC BLOOD PRESSURE: 179 MMHG | DIASTOLIC BLOOD PRESSURE: 106 MMHG

## 2018-09-11 DIAGNOSIS — E11.51: ICD-10-CM

## 2018-09-11 DIAGNOSIS — I16.1: Primary | ICD-10-CM

## 2018-09-11 DIAGNOSIS — N18.3: ICD-10-CM

## 2018-09-11 DIAGNOSIS — F17.210: ICD-10-CM

## 2018-09-11 DIAGNOSIS — Z91.14: ICD-10-CM

## 2018-09-11 DIAGNOSIS — M10.9: ICD-10-CM

## 2018-09-11 DIAGNOSIS — J18.9: ICD-10-CM

## 2018-09-11 DIAGNOSIS — J44.0: ICD-10-CM

## 2018-09-11 DIAGNOSIS — I12.9: ICD-10-CM

## 2018-09-11 DIAGNOSIS — D64.9: ICD-10-CM

## 2018-09-11 DIAGNOSIS — N17.9: ICD-10-CM

## 2018-09-11 DIAGNOSIS — E11.42: ICD-10-CM

## 2018-09-11 DIAGNOSIS — Z95.828: ICD-10-CM

## 2018-09-11 DIAGNOSIS — I70.1: ICD-10-CM

## 2018-09-11 DIAGNOSIS — E78.5: ICD-10-CM

## 2018-09-11 DIAGNOSIS — E11.22: ICD-10-CM

## 2018-09-11 DIAGNOSIS — B19.20: ICD-10-CM

## 2018-09-11 LAB
ALBUMIN SERPL-MCNC: 4 G/DL (ref 3.2–5)
ALP SERPL-CCNC: 93 U/L (ref 38–126)
ALT SERPL-CCNC: 22 U/L (ref 11–66)
ANION GAP SERPL CALCULATED.3IONS-SCNC: 20 MMOL/L
AST SERPL-CCNC: 30 U/L (ref 9–36)
BASOPHILS NFR BLD AUTO: 1 % (ref 0–3)
BILIRUB UR QL STRIP.AUTO: NEGATIVE
BUN SERPL-MCNC: 25 MG/DL (ref 8–23)
BUN/CREAT SERPL: 13
CHLORIDE SERPL-SCNC: 116 MMOL/L (ref 95–108)
CLARITY UR: CLEAR
CO2 SERPL-SCNC: 13 MMOL/L (ref 22–30)
COLOR UR AUTO: YELLOW
CREAT SERPL-MCNC: 2 MG/DL (ref 0.5–1)
EOSINOPHIL NFR BLD AUTO: 3 % (ref 0–8)
ERYTHROCYTE [DISTWIDTH] IN BLOOD BY AUTOMATED COUNT: 14.4 % (ref 11.5–15.5)
GLUCOSE UR STRIP.AUTO-MCNC: NEGATIVE MG/DL
HCT VFR BLD AUTO: 23.2 % (ref 37–47)
HGB BLD-MCNC: 7.9 G/DL (ref 12–16)
HGB UR QL STRIP.AUTO: NEGATIVE
IMM GRANULOCYTES NFR BLD: 0.3 % (ref 0–5)
KETONES UR STRIP.AUTO-MCNC: NEGATIVE MG/DL
LEUKOCYTE ESTERASE UR QL STRIP.AUTO: NEGATIVE
LYMPHOCYTES NFR BLD: 23 % (ref 15–41)
MCH RBC QN AUTO: 29.9 PG  CALC (ref 26–32)
MCHC RBC AUTO-ENTMCNC: 34.1 G/L CALC (ref 32–36)
MCV RBC AUTO: 87.9 FL  CALC (ref 80–100)
MONOCYTES NFR BLD AUTO: 8 % (ref 2–13)
MYOGLOBIN SERPL-MCNC: 51 NG/ML (ref 0–62)
NEUTROPHILS # BLD AUTO: 4.28 THOU/UL (ref 2–7.15)
NEUTROPHILS NFR BLD AUTO: 66 % (ref 42–76)
NITRITE UR QL STRIP.AUTO: NEGATIVE
PH UR STRIP.AUTO: 6 [PH] (ref 4.5–8)
PLATELET # BLD AUTO: 257 THOU/UL (ref 130–400)
POTASSIUM SERPL-SCNC: 4.5 MMOL/L (ref 3.5–5.1)
PROT SERPL-MCNC: 8.1 G/DL (ref 6.3–8.2)
PROT UR QL STRIP.AUTO: 30 MG/DL
RBC # BLD AUTO: 2.64 MILL/UL (ref 4.2–5.6)
RBC #/AREA URNS HPF: (no result) RBC/HPF (ref 0–5)
SODIUM SERPL-SCNC: 144 MMOL/L (ref 137–146)
SP GR UR STRIP.AUTO: 1.02
SQUAMOUS URNS QL MICRO: (no result) EPI/HPF
UROBILINOGEN UR QL STRIP.AUTO: 0.2 E.U./DL
WBC #/AREA URNS HPF: (no result) WBC/HPF (ref 0–5)

## 2018-09-11 PROCEDURE — S0164 INJECTION PANTROPRAZOLE: HCPCS

## 2018-09-12 ENCOUNTER — HOSPITAL ENCOUNTER (EMERGENCY)
Dept: HOSPITAL 82 - ED | Age: 63
LOS: 1 days | Discharge: ANOTHER HOSPITAL | End: 2018-09-13
Payer: COMMERCIAL

## 2018-09-12 VITALS — SYSTOLIC BLOOD PRESSURE: 161 MMHG | DIASTOLIC BLOOD PRESSURE: 70 MMHG

## 2018-09-12 VITALS — DIASTOLIC BLOOD PRESSURE: 81 MMHG | SYSTOLIC BLOOD PRESSURE: 155 MMHG

## 2018-09-12 VITALS — SYSTOLIC BLOOD PRESSURE: 163 MMHG | DIASTOLIC BLOOD PRESSURE: 68 MMHG

## 2018-09-12 VITALS — DIASTOLIC BLOOD PRESSURE: 87 MMHG | SYSTOLIC BLOOD PRESSURE: 188 MMHG

## 2018-09-12 VITALS — DIASTOLIC BLOOD PRESSURE: 71 MMHG | SYSTOLIC BLOOD PRESSURE: 172 MMHG

## 2018-09-12 VITALS — WEIGHT: 150.13 LBS | HEIGHT: 64 IN | BODY MASS INDEX: 25.63 KG/M2

## 2018-09-12 VITALS — SYSTOLIC BLOOD PRESSURE: 160 MMHG | DIASTOLIC BLOOD PRESSURE: 70 MMHG

## 2018-09-12 VITALS — DIASTOLIC BLOOD PRESSURE: 59 MMHG | SYSTOLIC BLOOD PRESSURE: 151 MMHG

## 2018-09-12 VITALS — DIASTOLIC BLOOD PRESSURE: 70 MMHG | SYSTOLIC BLOOD PRESSURE: 168 MMHG

## 2018-09-12 VITALS — DIASTOLIC BLOOD PRESSURE: 73 MMHG | SYSTOLIC BLOOD PRESSURE: 178 MMHG

## 2018-09-12 VITALS — SYSTOLIC BLOOD PRESSURE: 166 MMHG | DIASTOLIC BLOOD PRESSURE: 74 MMHG

## 2018-09-12 VITALS — SYSTOLIC BLOOD PRESSURE: 156 MMHG | DIASTOLIC BLOOD PRESSURE: 67 MMHG

## 2018-09-12 VITALS — DIASTOLIC BLOOD PRESSURE: 75 MMHG | SYSTOLIC BLOOD PRESSURE: 165 MMHG

## 2018-09-12 VITALS — DIASTOLIC BLOOD PRESSURE: 78 MMHG | SYSTOLIC BLOOD PRESSURE: 178 MMHG

## 2018-09-12 VITALS — SYSTOLIC BLOOD PRESSURE: 161 MMHG | DIASTOLIC BLOOD PRESSURE: 74 MMHG

## 2018-09-12 VITALS — DIASTOLIC BLOOD PRESSURE: 67 MMHG | SYSTOLIC BLOOD PRESSURE: 162 MMHG

## 2018-09-12 DIAGNOSIS — J18.9: Primary | ICD-10-CM

## 2018-09-12 DIAGNOSIS — I10: ICD-10-CM

## 2018-09-12 DIAGNOSIS — R06.02: ICD-10-CM

## 2018-09-12 DIAGNOSIS — Z95.828: ICD-10-CM

## 2018-09-12 DIAGNOSIS — M10.9: ICD-10-CM

## 2018-09-12 DIAGNOSIS — E11.9: ICD-10-CM

## 2018-09-12 DIAGNOSIS — F17.210: ICD-10-CM

## 2018-09-12 DIAGNOSIS — J44.1: ICD-10-CM

## 2018-09-12 DIAGNOSIS — Z95.5: ICD-10-CM

## 2018-09-12 DIAGNOSIS — D64.9: ICD-10-CM

## 2018-09-12 DIAGNOSIS — E87.2: ICD-10-CM

## 2018-09-12 DIAGNOSIS — J44.0: ICD-10-CM

## 2018-09-12 LAB
ALBUMIN SERPL-MCNC: 3.4 G/DL (ref 3.2–5)
ALBUMIN SERPL-MCNC: 3.9 G/DL (ref 3.2–5)
ALP SERPL-CCNC: 77 U/L (ref 38–126)
ALP SERPL-CCNC: 85 U/L (ref 38–126)
ALT SERPL-CCNC: 27 U/L (ref 11–66)
ALT SERPL-CCNC: 32 U/L (ref 11–66)
ANION GAP SERPL CALCULATED.3IONS-SCNC: 16 MMOL/L
ANION GAP SERPL CALCULATED.3IONS-SCNC: 17 MMOL/L
AST SERPL-CCNC: 15 U/L (ref 9–36)
AST SERPL-CCNC: 26 U/L (ref 9–36)
BARBITURATES UR-MCNC: NEGATIVE UG/ML
BASOPHILS NFR BLD AUTO: 0 % (ref 0–3)
BASOPHILS NFR BLD AUTO: 0 % (ref 0–3)
BILIRUB UR QL STRIP.AUTO: NEGATIVE
BUN SERPL-MCNC: 22 MG/DL (ref 8–23)
BUN SERPL-MCNC: 24 MG/DL (ref 8–23)
BUN/CREAT SERPL: 13
BUN/CREAT SERPL: 13
CHLORIDE SERPL-SCNC: 118 MMOL/L (ref 95–108)
CHLORIDE SERPL-SCNC: 120 MMOL/L (ref 95–108)
CLARITY UR: CLEAR
CO2 SERPL-SCNC: 12 MMOL/L (ref 22–30)
CO2 SERPL-SCNC: 12 MMOL/L (ref 22–30)
COCAINE UR-MCNC: NEGATIVE NG/ML
COLOR UR AUTO: YELLOW
CREAT SERPL-MCNC: 1.7 MG/DL (ref 0.5–1)
CREAT SERPL-MCNC: 1.8 MG/DL (ref 0.5–1)
EOSINOPHIL NFR BLD AUTO: 0 % (ref 0–8)
EOSINOPHIL NFR BLD AUTO: 0 % (ref 0–8)
ERYTHROCYTE [DISTWIDTH] IN BLOOD BY AUTOMATED COUNT: 14 % (ref 11.5–15.5)
ERYTHROCYTE [DISTWIDTH] IN BLOOD BY AUTOMATED COUNT: 14.3 % (ref 11.5–15.5)
GLUCOSE UR STRIP.AUTO-MCNC: NEGATIVE MG/DL
HCT VFR BLD AUTO: 20.3 % (ref 37–47)
HCT VFR BLD AUTO: 21.2 % (ref 37–47)
HGB BLD-MCNC: 7.2 G/DL (ref 12–16)
HGB BLD-MCNC: 7.4 G/DL (ref 12–16)
HGB UR QL STRIP.AUTO: (no result)
IMM GRANULOCYTES NFR BLD: 0.4 % (ref 0–5)
IMM GRANULOCYTES NFR BLD: 0.5 % (ref 0–5)
INR PPP: 1.1 RATIO (ref 0.7–1.3)
KETONES UR STRIP.AUTO-MCNC: NEGATIVE MG/DL
LEUKOCYTE ESTERASE UR QL STRIP.AUTO: NEGATIVE
LYMPHOCYTES NFR BLD: 13 % (ref 15–41)
LYMPHOCYTES NFR BLD: 15 % (ref 15–41)
MAGNESIUM SERPL-MCNC: 1.7 MG/DL (ref 1.6–2.3)
MCH RBC QN AUTO: 29.2 PG  CALC (ref 26–32)
MCH RBC QN AUTO: 29.8 PG  CALC (ref 26–32)
MCHC RBC AUTO-ENTMCNC: 34.9 G/L CALC (ref 32–36)
MCHC RBC AUTO-ENTMCNC: 35.5 G/L CALC (ref 32–36)
MCV RBC AUTO: 83.8 FL  CALC (ref 80–100)
MCV RBC AUTO: 83.9 FL  CALC (ref 80–100)
METHADONE SERPL-MCNC: NEGATIVE NG/ML
MONOCYTES NFR BLD AUTO: 7 % (ref 2–13)
MONOCYTES NFR BLD AUTO: 8 % (ref 2–13)
MYOGLOBIN SERPL-MCNC: 45 NG/ML (ref 0–62)
NEUTROPHILS # BLD AUTO: 4.54 THOU/UL (ref 2–7.15)
NEUTROPHILS # BLD AUTO: 8.46 THOU/UL (ref 2–7.15)
NEUTROPHILS NFR BLD AUTO: 77 % (ref 42–76)
NEUTROPHILS NFR BLD AUTO: 79 % (ref 42–76)
NITRITE UR QL STRIP.AUTO: NEGATIVE
OXCYCODONE: NEGATIVE
PH UR STRIP.AUTO: 6 [PH] (ref 4.5–8)
PLATELET # BLD AUTO: 237 THOU/UL (ref 130–400)
PLATELET # BLD AUTO: 282 THOU/UL (ref 130–400)
POTASSIUM SERPL-SCNC: 4 MMOL/L (ref 3.5–5.1)
POTASSIUM SERPL-SCNC: 4.3 MMOL/L (ref 3.5–5.1)
PROT SERPL-MCNC: 7 G/DL (ref 6.3–8.2)
PROT SERPL-MCNC: 8.1 G/DL (ref 6.3–8.2)
PROT UR QL STRIP.AUTO: 100 MG/DL
PROTHROMBIN TIME: 12 SECONDS (ref 9–12.5)
RBC # BLD AUTO: 2.42 MILL/UL (ref 4.2–5.6)
RBC # BLD AUTO: 2.53 MILL/UL (ref 4.2–5.6)
RBC #/AREA URNS HPF: (no result) RBC/HPF (ref 0–5)
SODIUM SERPL-SCNC: 143 MMOL/L (ref 137–146)
SODIUM SERPL-SCNC: 144 MMOL/L (ref 137–146)
SP GR UR STRIP.AUTO: 1.02
SQUAMOUS URNS QL MICRO: (no result) EPI/HPF
TETRAHYDROCANNABIONOL: NEGATIVE
TRICYLIC ANTIDEPRESSANTS: NEGATIVE
UROBILINOGEN UR QL STRIP.AUTO: 0.2 E.U./DL
WBC #/AREA URNS HPF: (no result) WBC/HPF (ref 0–5)

## 2018-09-13 VITALS — DIASTOLIC BLOOD PRESSURE: 75 MMHG | SYSTOLIC BLOOD PRESSURE: 181 MMHG

## 2018-12-31 ENCOUNTER — HOSPITAL ENCOUNTER (EMERGENCY)
Dept: HOSPITAL 82 - ED | Age: 63
LOS: 1 days | Discharge: ANOTHER HOSPITAL | End: 2019-01-01
Payer: COMMERCIAL

## 2018-12-31 VITALS — BODY MASS INDEX: 25.63 KG/M2 | WEIGHT: 150.13 LBS | HEIGHT: 64 IN

## 2018-12-31 DIAGNOSIS — N19: ICD-10-CM

## 2018-12-31 DIAGNOSIS — Z72.0: ICD-10-CM

## 2018-12-31 DIAGNOSIS — Z95.5: ICD-10-CM

## 2018-12-31 DIAGNOSIS — I16.0: ICD-10-CM

## 2018-12-31 DIAGNOSIS — R94.31: ICD-10-CM

## 2018-12-31 DIAGNOSIS — E11.9: ICD-10-CM

## 2018-12-31 DIAGNOSIS — D64.9: ICD-10-CM

## 2018-12-31 DIAGNOSIS — K92.2: Primary | ICD-10-CM

## 2018-12-31 DIAGNOSIS — R51: ICD-10-CM

## 2018-12-31 DIAGNOSIS — R42: ICD-10-CM

## 2018-12-31 DIAGNOSIS — R53.1: ICD-10-CM

## 2019-01-01 VITALS — DIASTOLIC BLOOD PRESSURE: 87 MMHG | SYSTOLIC BLOOD PRESSURE: 201 MMHG

## 2019-01-01 LAB
ALBUMIN SERPL-MCNC: 4.3 G/DL (ref 3.2–5)
ALP SERPL-CCNC: 77 U/L (ref 38–126)
ANION GAP SERPL CALCULATED.3IONS-SCNC: 18 MMOL/L
AST SERPL-CCNC: 37 U/L (ref 9–36)
BACTERIA #/AREA URNS HPF: (no result) HPF
BASOPHILS NFR BLD AUTO: 1 % (ref 0–3)
BILIRUB UR QL STRIP.AUTO: NEGATIVE
BUN SERPL-MCNC: 63 MG/DL (ref 8–23)
BUN/CREAT SERPL: 27
CHLORIDE SERPL-SCNC: 110 MMOL/L (ref 95–108)
CO2 SERPL-SCNC: 19 MMOL/L (ref 22–30)
COLOR UR AUTO: YELLOW
CREAT SERPL-MCNC: 2.3 MG/DL (ref 0.5–1)
EOSINOPHIL NFR BLD AUTO: 2 % (ref 0–8)
EPI CELLS URNS QL MICRO: (no result) EPI/HPF
ERYTHROCYTE [DISTWIDTH] IN BLOOD BY AUTOMATED COUNT: 13.8 % (ref 11.5–15.5)
GLUCOSE UR STRIP.AUTO-MCNC: NEGATIVE MG/DL
HCT VFR BLD AUTO: 20.8 % (ref 37–47)
HGB BLD-MCNC: 7.3 G/DL (ref 12–16)
HGB UR QL STRIP.AUTO: (no result)
IMM GRANULOCYTES NFR BLD: 0.5 % (ref 0–5)
KETONES UR STRIP.AUTO-MCNC: NEGATIVE MG/DL
LEUKOCYTE ESTERASE UR QL STRIP.AUTO: NEGATIVE
LYMPHOCYTES NFR BLD: 44 % (ref 15–41)
MCH RBC QN AUTO: 29.9 PG  CALC (ref 26–32)
MCHC RBC AUTO-ENTMCNC: 35.1 G/L CALC (ref 32–36)
MCV RBC AUTO: 85.2 FL  CALC (ref 80–100)
MONOCYTES NFR BLD AUTO: 8 % (ref 2–13)
MYOGLOBIN SERPL-MCNC: 61 NG/ML (ref 0–62)
NEUTROPHILS # BLD AUTO: 2.64 THOU/UL (ref 2–7.15)
NEUTROPHILS NFR BLD AUTO: 45 % (ref 42–76)
NITRITE UR QL STRIP.AUTO: NEGATIVE
PH UR STRIP.AUTO: 6 [PH] (ref 4.5–8)
PLATELET # BLD AUTO: 242 THOU/UL (ref 130–400)
POTASSIUM SERPL-SCNC: 4 MMOL/L (ref 3.5–5.1)
PROT SERPL-MCNC: 8.6 G/DL (ref 6.3–8.2)
PROT UR QL STRIP.AUTO: 100 MG/DL
RBC # BLD AUTO: 2.44 MILL/UL (ref 4.2–5.6)
RBC #/AREA URNS HPF: (no result) RBC/HPF (ref 0–5)
SODIUM SERPL-SCNC: 143 MMOL/L (ref 137–146)
SP GR UR STRIP.AUTO: 1.02
UROBILINOGEN UR QL STRIP.AUTO: 0.2 E.U./DL
WBC #/AREA URNS HPF: (no result) WBC/HPF (ref 0–5)

## 2019-02-04 ENCOUNTER — HOSPITAL ENCOUNTER (INPATIENT)
Dept: HOSPITAL 82 - ED | Age: 64
LOS: 1 days | Discharge: LEFT BEFORE DISCHARGE | DRG: 305 | End: 2019-02-05
Attending: INTERNAL MEDICINE | Admitting: INTERNAL MEDICINE
Payer: COMMERCIAL

## 2019-02-04 VITALS — SYSTOLIC BLOOD PRESSURE: 190 MMHG | DIASTOLIC BLOOD PRESSURE: 80 MMHG

## 2019-02-04 VITALS — SYSTOLIC BLOOD PRESSURE: 194 MMHG | DIASTOLIC BLOOD PRESSURE: 87 MMHG

## 2019-02-04 VITALS — SYSTOLIC BLOOD PRESSURE: 197 MMHG | DIASTOLIC BLOOD PRESSURE: 87 MMHG

## 2019-02-04 VITALS — SYSTOLIC BLOOD PRESSURE: 189 MMHG | DIASTOLIC BLOOD PRESSURE: 79 MMHG

## 2019-02-04 VITALS — WEIGHT: 131.44 LBS | HEIGHT: 65 IN | BODY MASS INDEX: 21.9 KG/M2

## 2019-02-04 VITALS — SYSTOLIC BLOOD PRESSURE: 193 MMHG | DIASTOLIC BLOOD PRESSURE: 87 MMHG

## 2019-02-04 VITALS — SYSTOLIC BLOOD PRESSURE: 171 MMHG | DIASTOLIC BLOOD PRESSURE: 76 MMHG

## 2019-02-04 VITALS — DIASTOLIC BLOOD PRESSURE: 88 MMHG | SYSTOLIC BLOOD PRESSURE: 189 MMHG

## 2019-02-04 VITALS — SYSTOLIC BLOOD PRESSURE: 193 MMHG | DIASTOLIC BLOOD PRESSURE: 84 MMHG

## 2019-02-04 VITALS — DIASTOLIC BLOOD PRESSURE: 85 MMHG | SYSTOLIC BLOOD PRESSURE: 198 MMHG

## 2019-02-04 VITALS — SYSTOLIC BLOOD PRESSURE: 178 MMHG | DIASTOLIC BLOOD PRESSURE: 78 MMHG

## 2019-02-04 VITALS — SYSTOLIC BLOOD PRESSURE: 178 MMHG | DIASTOLIC BLOOD PRESSURE: 75 MMHG

## 2019-02-04 VITALS — SYSTOLIC BLOOD PRESSURE: 172 MMHG | DIASTOLIC BLOOD PRESSURE: 82 MMHG

## 2019-02-04 VITALS — DIASTOLIC BLOOD PRESSURE: 82 MMHG | SYSTOLIC BLOOD PRESSURE: 191 MMHG

## 2019-02-04 VITALS — SYSTOLIC BLOOD PRESSURE: 172 MMHG | DIASTOLIC BLOOD PRESSURE: 72 MMHG

## 2019-02-04 VITALS — SYSTOLIC BLOOD PRESSURE: 186 MMHG | DIASTOLIC BLOOD PRESSURE: 79 MMHG

## 2019-02-04 VITALS — DIASTOLIC BLOOD PRESSURE: 89 MMHG | SYSTOLIC BLOOD PRESSURE: 189 MMHG

## 2019-02-04 VITALS — SYSTOLIC BLOOD PRESSURE: 174 MMHG | DIASTOLIC BLOOD PRESSURE: 76 MMHG

## 2019-02-04 VITALS — SYSTOLIC BLOOD PRESSURE: 220 MMHG | DIASTOLIC BLOOD PRESSURE: 88 MMHG

## 2019-02-04 VITALS — DIASTOLIC BLOOD PRESSURE: 94 MMHG | SYSTOLIC BLOOD PRESSURE: 214 MMHG

## 2019-02-04 VITALS — DIASTOLIC BLOOD PRESSURE: 87 MMHG | SYSTOLIC BLOOD PRESSURE: 195 MMHG

## 2019-02-04 VITALS — SYSTOLIC BLOOD PRESSURE: 192 MMHG | DIASTOLIC BLOOD PRESSURE: 75 MMHG

## 2019-02-04 VITALS — SYSTOLIC BLOOD PRESSURE: 205 MMHG | DIASTOLIC BLOOD PRESSURE: 90 MMHG

## 2019-02-04 VITALS — SYSTOLIC BLOOD PRESSURE: 174 MMHG | DIASTOLIC BLOOD PRESSURE: 77 MMHG

## 2019-02-04 VITALS — SYSTOLIC BLOOD PRESSURE: 169 MMHG | DIASTOLIC BLOOD PRESSURE: 66 MMHG

## 2019-02-04 DIAGNOSIS — Z95.2: ICD-10-CM

## 2019-02-04 DIAGNOSIS — F41.1: ICD-10-CM

## 2019-02-04 DIAGNOSIS — E11.51: ICD-10-CM

## 2019-02-04 DIAGNOSIS — B19.20: ICD-10-CM

## 2019-02-04 DIAGNOSIS — J44.9: ICD-10-CM

## 2019-02-04 DIAGNOSIS — I10: ICD-10-CM

## 2019-02-04 DIAGNOSIS — M10.9: ICD-10-CM

## 2019-02-04 DIAGNOSIS — I25.10: ICD-10-CM

## 2019-02-04 DIAGNOSIS — T46.5X6A: ICD-10-CM

## 2019-02-04 DIAGNOSIS — E78.5: ICD-10-CM

## 2019-02-04 DIAGNOSIS — F17.210: ICD-10-CM

## 2019-02-04 DIAGNOSIS — F12.10: ICD-10-CM

## 2019-02-04 DIAGNOSIS — F14.10: ICD-10-CM

## 2019-02-04 DIAGNOSIS — I16.1: Primary | ICD-10-CM

## 2019-02-04 DIAGNOSIS — Z91.128: ICD-10-CM

## 2019-02-04 DIAGNOSIS — I70.1: ICD-10-CM

## 2019-02-04 DIAGNOSIS — Z95.5: ICD-10-CM

## 2019-02-04 LAB
ALBUMIN SERPL-MCNC: 4.4 G/DL (ref 3.2–5)
ALP SERPL-CCNC: 123 U/L (ref 38–126)
ANION GAP SERPL CALCULATED.3IONS-SCNC: 15 MMOL/L
AST SERPL-CCNC: 29 U/L (ref 9–36)
BACTERIA #/AREA URNS HPF: (no result) HPF
BASOPHILS NFR BLD AUTO: 1 % (ref 0–3)
BILIRUB UR QL STRIP.AUTO: NEGATIVE
BUN SERPL-MCNC: 33 MG/DL (ref 8–23)
BUN/CREAT SERPL: 15
CHLORIDE SERPL-SCNC: 113 MMOL/L (ref 95–108)
CO2 SERPL-SCNC: 18 MMOL/L (ref 22–30)
COLOR UR AUTO: YELLOW
CREAT SERPL-MCNC: 2.3 MG/DL (ref 0.5–1)
EOSINOPHIL NFR BLD AUTO: 2 % (ref 0–8)
ERYTHROCYTE [DISTWIDTH] IN BLOOD BY AUTOMATED COUNT: 13.6 % (ref 11.5–15.5)
GLUCOSE UR STRIP.AUTO-MCNC: NEGATIVE MG/DL
HCT VFR BLD AUTO: 29.1 % (ref 37–47)
HGB BLD-MCNC: 10.2 G/DL (ref 12–16)
HGB UR QL STRIP.AUTO: NEGATIVE
IMM GRANULOCYTES NFR BLD: 0.4 % (ref 0–5)
KETONES UR STRIP.AUTO-MCNC: NEGATIVE MG/DL
LEUKOCYTE ESTERASE UR QL STRIP.AUTO: NEGATIVE
LYMPHOCYTES NFR BLD: 21 % (ref 15–41)
MCH RBC QN AUTO: 30 PG  CALC (ref 26–32)
MCHC RBC AUTO-ENTMCNC: 35.1 G/L CALC (ref 32–36)
MCV RBC AUTO: 85.6 FL  CALC (ref 80–100)
MONOCYTES NFR BLD AUTO: 11 % (ref 2–13)
MYOGLOBIN SERPL-MCNC: 79 NG/ML (ref 0–62)
NEUTROPHILS # BLD AUTO: 5.2 THOU/UL (ref 2–7.15)
NEUTROPHILS NFR BLD AUTO: 65 % (ref 42–76)
NITRITE UR QL STRIP.AUTO: NEGATIVE
PH UR STRIP.AUTO: 6 [PH] (ref 4.5–8)
PLATELET # BLD AUTO: 258 THOU/UL (ref 130–400)
POTASSIUM SERPL-SCNC: 4.3 MMOL/L (ref 3.5–5.1)
PROT SERPL-MCNC: 8.9 G/DL (ref 6.3–8.2)
PROT UR QL STRIP.AUTO: 100 MG/DL
RBC # BLD AUTO: 3.4 MILL/UL (ref 4.2–5.6)
RBC #/AREA URNS HPF: (no result) RBC/HPF (ref 0–5)
SODIUM SERPL-SCNC: 142 MMOL/L (ref 137–146)
SP GR UR STRIP.AUTO: 1.02
SQUAMOUS URNS QL MICRO: (no result) EPI/HPF
UROBILINOGEN UR QL STRIP.AUTO: 0.2 E.U./DL

## 2019-02-04 NOTE — NUR
REPORT PROVIDED TO GEOVANNY SPRAGUE, ICU. PT TO ICU ON CARDENE DRIP AND CARDIAC
MONITOR IN STABLE CONDITION. IV SITE HEALTHY.

## 2019-02-04 NOTE — NUR
PT ARRIVED TO ICU 4 FROM ER, IN STABLE CONDITION, BY STRETCHER WITH CARDENE
RUNNING. PT ABLE TO TRANSFER TO NEW BED, WITH MINIMAL ASSISTANCE. PT HAS MANY
REQUESTS UPON ARRIVAL.

## 2019-02-04 NOTE — NUR
PT C/O LLQ ABD PAIN x3 HOURS. DENIES CP. DENIES SOB. BREATHING IS
EVEN/UNLABORED, LUNG SOUNDS CLEAR. STRONG PULSES x4. MAC. NO EDEMA. NSR ON
TELE. SKIN WARM/DRY. PT DENIES WOUNDS. ABD SOFT/TENDER, NO GAURDING OR FACIAL
CHANGES WITH PALPATION. STATES "NORMAL" BM THIS AM, DENIES NEEDING LAXATIVES
AT HOME. STATES SHE HAD A DRINK LAST NIGHT FOR THE GAME. ADMITS TO OCCASSIONAL
MARIJUANA AND TOBACCO PRODUCTS. DENIES N/V/D. PT CAME TO ER FOR CP x3 DAYS BUT
STATES THE PAIN MOVED TO HER LLQ ABD WHILE HERE. PT STATES SHE TAKES TRAMADOL
AT HOME FOR PAIN AND USES GABAPENTIN FOR PAIN TO FOOT. PT LIVES WITH HER
DAUGHTER & FEELS SAFE. PT IS TRANSPORTED, DOES NOT DRIVE. PT STATES SHE WAS IN
[facility] RECENTLY FOR GI BLEED AND HAS NOT TAKEN HER MEDICINE SINCE LEAVING. PT
ALSO STATES SHE DOES NOT KNOW WHAT MEDCINE SHE IS SUPPOSED TO TAKE. PT STATES
SHE FEEL x1 A COUPLE WEEKS AGO, BUT THAT WAS BC THE MARIJUANA MADE HER DIZZY.
STATES SHE IS SUPPOSED TO USE A CANE TO WALK BUT SHE DOESNT. DENIES TAKING
PAIN MEDS FOR CP. PT STATES [facility] CALLED IN HER
MEDICINES FOR REFILLS THIS AM. PT ADMITS TO BEING DEPRESSED BUT DENIES WANTING
TO HURT HERSELF OR OTHERS. PT CONTINUES TO MAKE FREQUENT REQUESTS.

## 2019-02-04 NOTE — NUR
Attempted to get home medication list from patient; however she was not able
to recall all of her medications. I was able to obtain her home medications
from her pharmacy which is [facility] in [place]. Devi the pharmacy technician
verbally gave me her medications that the patient fills there. The patient's
PCP is Dr. Montes the [facility] here in [facility].

## 2019-02-04 NOTE — NUR
PT REQUESTING TUMS AT THIS TIME FOR HEART BURN. EXPLAINED TO PT THAT SHE HAD
REQUESTED PAIN MEDICATION PREVIOUSLY AND WAS PROVIDED MEDICATION VIA IV. PT
THEN REQUESTED A SODA. SODA PROVIDED. CALL LIGHT IN REACH. WILL CONTINUE TO
MONITOR.

## 2019-02-04 NOTE — NUR
CARDENE INCREASED TO 10MG/HR DUE TO RISING BP. PT AWOKE AS I ENTERED, I
EXPLAINED THAT I WAS INCREASING MEDICINE. PT ASKED WHY HER BLOOD PRESSURE IS
GOING UP AGAIN BC "SHE TAKES HER MEDICATION EVERY NIGHT".

## 2019-02-04 NOTE — NUR
PT STATES SHE DOESNT LIKE DINNER, "SAME CRAP THEY KEPT GIVING ME AT [place]".
PT REQUEST TO USE UNIT CORDLESS PHONE BUT WE CAN NOT FIND IT AT THIS TIME.

## 2019-02-04 NOTE — NUR
PT SITTING UP IN BED TALKING WITH VISITOR. PT IS ALERT AND ORIENTED X3. SHIFT
ASSESSMENT COMPLETED AT THIS TIME. IV PATENT X1. CARDENE INFUSING PER
TITRATION CHARTING. PT WITH COMPLAINTS OF LEFT UPPER QUADRANT ABDOMINAL PAIN.
PT REPRTS THAT IT IS STABBING AND NOT RELIEVED BY ANYTHING AND IS REQUESTING
PAIN MEDICATION. DR ANDERSON NOTIFIED AND NEW ORDERS RECEIVED. CALL LIGHT IN
REACH. WILL CONTINUE TO MONITOR.

## 2019-02-04 NOTE — NUR
PT ARRIVED TO ICU 4 FROM ER, IN STABLE CONDITION, BY STRETCHER WITH CARDENE
RUNNING. PT ABLE TO TRANSFER TO NEW BED, WITH MINIMAL ASSISTANCE. PT HAS MANY
REQUESTS UPON ARRIVAL. PT STATES SHE "WANTS THE 2 BLACK GIRLS WHO WORK HERE TO
TAKE CARE OF HER".

## 2019-02-04 NOTE — NUR
RECVD REPORT FROM GEOVANNY LIEBERMAN IN THE ER. SHE WILL TRY TO CONTACT FAMILY FOR
HOME MEDS & PLACED CONSULT FOR MED REC.

## 2019-02-05 VITALS — SYSTOLIC BLOOD PRESSURE: 177 MMHG | DIASTOLIC BLOOD PRESSURE: 74 MMHG

## 2019-02-05 VITALS — SYSTOLIC BLOOD PRESSURE: 175 MMHG | DIASTOLIC BLOOD PRESSURE: 81 MMHG

## 2019-02-05 VITALS — SYSTOLIC BLOOD PRESSURE: 167 MMHG | DIASTOLIC BLOOD PRESSURE: 71 MMHG

## 2019-02-05 VITALS — SYSTOLIC BLOOD PRESSURE: 195 MMHG | DIASTOLIC BLOOD PRESSURE: 88 MMHG

## 2019-02-05 VITALS — DIASTOLIC BLOOD PRESSURE: 82 MMHG | SYSTOLIC BLOOD PRESSURE: 173 MMHG

## 2019-02-05 VITALS — SYSTOLIC BLOOD PRESSURE: 182 MMHG | DIASTOLIC BLOOD PRESSURE: 74 MMHG

## 2019-02-05 VITALS — SYSTOLIC BLOOD PRESSURE: 193 MMHG | DIASTOLIC BLOOD PRESSURE: 85 MMHG

## 2019-02-05 VITALS — DIASTOLIC BLOOD PRESSURE: 98 MMHG | SYSTOLIC BLOOD PRESSURE: 201 MMHG

## 2019-02-05 VITALS — DIASTOLIC BLOOD PRESSURE: 78 MMHG | SYSTOLIC BLOOD PRESSURE: 177 MMHG

## 2019-02-05 VITALS — DIASTOLIC BLOOD PRESSURE: 78 MMHG | SYSTOLIC BLOOD PRESSURE: 168 MMHG

## 2019-02-05 VITALS — DIASTOLIC BLOOD PRESSURE: 76 MMHG | SYSTOLIC BLOOD PRESSURE: 176 MMHG

## 2019-02-05 LAB
ALBUMIN SERPL-MCNC: 3.7 G/DL (ref 3.2–5)
ALP SERPL-CCNC: 95 U/L (ref 38–126)
AMYLASE SERPL-CCNC: 135 U/L (ref 30–110)
ANION GAP SERPL CALCULATED.3IONS-SCNC: 15 MMOL/L
AST SERPL-CCNC: 22 U/L (ref 9–36)
BASOPHILS NFR BLD AUTO: 1 % (ref 0–3)
BUN SERPL-MCNC: 27 MG/DL (ref 8–23)
BUN/CREAT SERPL: 15
CHLORIDE SERPL-SCNC: 116 MMOL/L (ref 95–108)
CO2 SERPL-SCNC: 16 MMOL/L (ref 22–30)
CREAT SERPL-MCNC: 1.9 MG/DL (ref 0.5–1)
EOSINOPHIL NFR BLD AUTO: 3 % (ref 0–8)
ERYTHROCYTE [DISTWIDTH] IN BLOOD BY AUTOMATED COUNT: 13.5 % (ref 11.5–15.5)
HCT VFR BLD AUTO: 27.3 % (ref 37–47)
HGB BLD-MCNC: 9.5 G/DL (ref 12–16)
IMM GRANULOCYTES NFR BLD: 0.5 % (ref 0–5)
LIPASE SERPL-CCNC: 86 U/L (ref 23–300)
LYMPHOCYTES NFR BLD: 23 % (ref 15–41)
MAGNESIUM SERPL-MCNC: 2 MG/DL (ref 1.6–2.3)
MCH RBC QN AUTO: 29.4 PG  CALC (ref 26–32)
MCHC RBC AUTO-ENTMCNC: 34.8 G/L CALC (ref 32–36)
MCV RBC AUTO: 84.5 FL  CALC (ref 80–100)
MONOCYTES NFR BLD AUTO: 10 % (ref 2–13)
NEUTROPHILS # BLD AUTO: 4.84 THOU/UL (ref 2–7.15)
NEUTROPHILS NFR BLD AUTO: 63 % (ref 42–76)
PLATELET # BLD AUTO: 282 THOU/UL (ref 130–400)
POTASSIUM SERPL-SCNC: 4.2 MMOL/L (ref 3.5–5.1)
PROT SERPL-MCNC: 7.6 G/DL (ref 6.3–8.2)
RBC # BLD AUTO: 3.23 MILL/UL (ref 4.2–5.6)
SODIUM SERPL-SCNC: 143 MMOL/L (ref 137–146)

## 2019-02-05 NOTE — NUR
#20 IV IN LAC DC'D, TIP INTACT, DRESSING APPLIED. PT DISCONNECTED FROM
MONITORS. SISTER @BEDSIDE. PT/SISTER AWARE PT IS LEAVING AGAINST MEDICAL
ADVICE. PT STATES SHE CANT STAY IN THAT TINY ROOM. STATES SHE WILL GO TO
[facility] TO  HER MEDICINE. SISTER SAYS SHE CAN GO TO HER KIDNEY DR
([facility]). PT GIVEN HOSPITAL "PANTIES". AUX CALLED FOR WC DOWNSTAIRS. PT
SIGNED AMA PAPERWORK. LAST V/S: HR 74, /98, O2 99% RA, RR 30

## 2019-02-05 NOTE — NUR
BAG OF CARDENE COMPLETED. /85. WILL WAIT UNTIL PT SPEAKS WITH DR ANDERSON
BEFORE MIXING ANOTHER BAG D/T PT STATING SHE WANTS TO LEAVE.

## 2019-02-05 NOTE — NUR
PT RESTING IN BED WITH EYES CLOSED. RESP ARE EVEN AND UNLABORED. NO DISTRESS
NOTED. CALL LIGHT IN REACH. WILL CONTINUE TO MONITOR.

## 2019-02-05 NOTE — NUR
PT RESTING IN BED WITH EYES CLOSED. AT THIS TIME. RESP ARE EVEN AND UNLABORED.
NO DISTRESS NOTED. CALL LIGHT IN REACH. WILL CONTINUE TO MONITOR

## 2019-02-05 NOTE — NUR
PT MEDICATED WITH PO BP MEDS. PT ASKING ABOUT SIGNING OUT AMA. STATES "SHE'LL
JUST GO TO [facility] AND GET HER MEDS." ASKED HER TO WAIT UNTIL MD ROUNDS THIS
AM.

## 2019-02-05 NOTE — NUR
REMINDED PT HOW TO USE BED CONTROLS, ALLOWED PT TO REPOSITION SELF.
PT SITTING UP IN BED, EATING BREAKFAST.

## 2019-02-05 NOTE — NUR
PT ASSITED TO BSC TO VOID. PT THEN ASSISTED BACK TO BED. CALL LIGHT IN REACH.
[facility]

## 2019-02-05 NOTE — NUR
PT ASSISTED TO BSC THEN BACK TO BED. PT TOLERATED WELL. CALL LIGHT IN REACH.
WILL CONTINUE TO MONITOR

## 2019-03-25 ENCOUNTER — HOSPITAL ENCOUNTER (INPATIENT)
Dept: HOSPITAL 82 - ED | Age: 64
LOS: 3 days | Discharge: NURSING, REHAB OR LONG-TERM CARE | DRG: 683 | End: 2019-03-28
Attending: INTERNAL MEDICINE | Admitting: INTERNAL MEDICINE
Payer: COMMERCIAL

## 2019-03-25 VITALS — HEIGHT: 65 IN | WEIGHT: 141.12 LBS | BODY MASS INDEX: 23.51 KG/M2

## 2019-03-25 VITALS — SYSTOLIC BLOOD PRESSURE: 160 MMHG | DIASTOLIC BLOOD PRESSURE: 82 MMHG

## 2019-03-25 VITALS — SYSTOLIC BLOOD PRESSURE: 160 MMHG | DIASTOLIC BLOOD PRESSURE: 80 MMHG

## 2019-03-25 VITALS — SYSTOLIC BLOOD PRESSURE: 220 MMHG | DIASTOLIC BLOOD PRESSURE: 110 MMHG

## 2019-03-25 DIAGNOSIS — I25.10: ICD-10-CM
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
## 2019-03-25 DIAGNOSIS — Z95.828: ICD-10-CM

## 2019-03-25 DIAGNOSIS — T44.5X5A: ICD-10-CM

## 2019-03-25 DIAGNOSIS — B18.2: ICD-10-CM

## 2019-03-25 DIAGNOSIS — F17.210: ICD-10-CM

## 2019-03-25 DIAGNOSIS — E83.39: ICD-10-CM

## 2019-03-25 DIAGNOSIS — D63.1: ICD-10-CM

## 2019-03-25 DIAGNOSIS — F14.10: ICD-10-CM

## 2019-03-25 DIAGNOSIS — Z95.5: ICD-10-CM

## 2019-03-25 DIAGNOSIS — S20.212A: ICD-10-CM

## 2019-03-25 DIAGNOSIS — E87.2: ICD-10-CM

## 2019-03-25 DIAGNOSIS — E87.5: ICD-10-CM

## 2019-03-25 DIAGNOSIS — Z79.4: ICD-10-CM

## 2019-03-25 DIAGNOSIS — N18.4: ICD-10-CM

## 2019-03-25 DIAGNOSIS — E11.22: ICD-10-CM

## 2019-03-25 DIAGNOSIS — I12.9: ICD-10-CM

## 2019-03-25 DIAGNOSIS — N17.9: Primary | ICD-10-CM

## 2019-03-25 DIAGNOSIS — J44.9: ICD-10-CM

## 2019-03-25 DIAGNOSIS — F12.10: ICD-10-CM

## 2019-03-25 DIAGNOSIS — E11.21: ICD-10-CM

## 2019-03-25 LAB
ALBUMIN SERPL-MCNC: 4.5 G/DL (ref 3.2–5)
ALP SERPL-CCNC: 130 U/L (ref 38–126)
ANION GAP SERPL CALCULATED.3IONS-SCNC: 17 MMOL/L
AST SERPL-CCNC: 30 U/L (ref 9–36)
BASOPHILS NFR BLD AUTO: 1 % (ref 0–3)
BILIRUB UR QL STRIP.AUTO: NEGATIVE
BUN SERPL-MCNC: 65 MG/DL (ref 8–23)
BUN/CREAT SERPL: 18
CHLORIDE SERPL-SCNC: 115 MMOL/L (ref 95–108)
CLARITY UR: CLEAR
CO2 SERPL-SCNC: 15 MMOL/L (ref 22–30)
COLOR UR AUTO: YELLOW
CREAT SERPL-MCNC: 3.6 MG/DL (ref 0.5–1)
EOSINOPHIL NFR BLD AUTO: 1 % (ref 0–8)
ERYTHROCYTE [DISTWIDTH] IN BLOOD BY AUTOMATED COUNT: 15.6 % (ref 11.5–15.5)
GLUCOSE UR STRIP.AUTO-MCNC: NEGATIVE MG/DL
HCT VFR BLD AUTO: 28.7 % (ref 37–47)
HGB BLD-MCNC: 9.8 G/DL (ref 12–16)
HGB UR QL STRIP.AUTO: NEGATIVE
IMM GRANULOCYTES NFR BLD: 0.4 % (ref 0–5)
KETONES UR STRIP.AUTO-MCNC: NEGATIVE MG/DL
LEUKOCYTE ESTERASE UR QL STRIP.AUTO: NEGATIVE
LYMPHOCYTES NFR BLD: 20 % (ref 15–41)
MCH RBC QN AUTO: 28.4 PG  CALC (ref 26–32)
MCHC RBC AUTO-ENTMCNC: 34.1 G/L CALC (ref 32–36)
MCV RBC AUTO: 83.2 FL  CALC (ref 80–100)
MONOCYTES NFR BLD AUTO: 8 % (ref 2–13)
NEUTROPHILS # BLD AUTO: 5.4 THOU/UL (ref 2–7.15)
NEUTROPHILS NFR BLD AUTO: 69 % (ref 42–76)
NITRITE UR QL STRIP.AUTO: NEGATIVE
PH UR STRIP.AUTO: 5.5 [PH] (ref 4.5–8)
PLATELET # BLD AUTO: 277 THOU/UL (ref 130–400)
POTASSIUM SERPL-SCNC: 6.1 MMOL/L (ref 3.5–5.1)
PROT SERPL-MCNC: 9.3 G/DL (ref 6.3–8.2)
PROT UR STRIP.AUTO-MCNC: 100 MG/DL
RBC # BLD AUTO: 3.45 MILL/UL (ref 4.2–5.6)
RBC #/AREA URNS HPF: (no result) RBC/HPF (ref 0–5)
SODIUM SERPL-SCNC: 141 MMOL/L (ref 137–146)
SP GR UR STRIP.AUTO: >=1.03
SQUAMOUS URNS QL MICRO: (no result) EPI/HPF
UROBILINOGEN UR QL STRIP.AUTO: 0.2 E.U./DL
WBC #/AREA URNS HPF: (no result) WBC/HPF (ref 0–5)

## 2019-03-25 NOTE — NUR
UNABLE TO VERIFY MEDICATIONS WITH PT,  PHARMACY CONSULT PLACED.  PT UP TO
BEDSIDE COMMODE.  PT STATES VERY PAINFUL STILL IN RIB AREA.

## 2019-03-25 NOTE — NUR
RECEIVED REPORT FROM NURSE WILLIS, PATIENT LYING IN BED, GRIMACING, GUARDING
PAIN SCALE OF 10/10, /110 MANULAMARYCARMEN, CALLED DR. MIRANDA WITH ORDERS MADE
ORDERS FAX TO PHARMACY. AWAITING VERIFICATION.

## 2019-03-25 NOTE — NUR
PT ARRIVED ON FLOOR VIA STRETCHER; AMBULATE TO DIGITAL SCALE WITH SLOW GAIT,
ASSIST X2; WT AND VITALS OBTAINED; RESP EVEN AND UNLABORED ON ROOM AIR; TELE
IN PLACE, READING SR 78 ON MONITOR; ORIENT TO ROOM AND CALL BELL SYSTEM;
SAFETY PRECAUTION REINFORCE; PT NOW EATING SUPPER.

## 2019-03-25 NOTE — NUR
PT RESTING COMFORTABLY ON STRETCHER, FLUIDS INFUSING, WARM BLANKETS GIVEN,  PT
STATES STILL HURTS TO MOVE.   NOTIFIED.  CALL LIGHT WITHIN REACH

## 2019-03-26 VITALS — DIASTOLIC BLOOD PRESSURE: 89 MMHG | SYSTOLIC BLOOD PRESSURE: 182 MMHG

## 2019-03-26 VITALS — DIASTOLIC BLOOD PRESSURE: 98 MMHG | SYSTOLIC BLOOD PRESSURE: 160 MMHG

## 2019-03-26 VITALS — DIASTOLIC BLOOD PRESSURE: 64 MMHG | SYSTOLIC BLOOD PRESSURE: 116 MMHG

## 2019-03-26 VITALS — SYSTOLIC BLOOD PRESSURE: 117 MMHG | DIASTOLIC BLOOD PRESSURE: 56 MMHG

## 2019-03-26 VITALS — SYSTOLIC BLOOD PRESSURE: 178 MMHG | DIASTOLIC BLOOD PRESSURE: 85 MMHG

## 2019-03-26 VITALS — SYSTOLIC BLOOD PRESSURE: 179 MMHG | DIASTOLIC BLOOD PRESSURE: 84 MMHG

## 2019-03-26 VITALS — SYSTOLIC BLOOD PRESSURE: 156 MMHG | DIASTOLIC BLOOD PRESSURE: 82 MMHG

## 2019-03-26 VITALS — DIASTOLIC BLOOD PRESSURE: 91 MMHG | SYSTOLIC BLOOD PRESSURE: 172 MMHG

## 2019-03-26 LAB
ALBUMIN SERPL-MCNC: 3.8 G/DL (ref 3.2–5)
ALP SERPL-CCNC: 112 U/L (ref 38–126)
AMYLASE SERPL-CCNC: 142 U/L (ref 30–110)
ANION GAP SERPL CALCULATED.3IONS-SCNC: 16 MMOL/L
AST SERPL-CCNC: 25 U/L (ref 9–36)
BASOPHILS NFR BLD AUTO: 1 % (ref 0–3)
BUN SERPL-MCNC: 61 MG/DL (ref 8–23)
BUN/CREAT SERPL: 19
CHLORIDE SERPL-SCNC: 115 MMOL/L (ref 95–108)
CO2 SERPL-SCNC: 13 MMOL/L (ref 22–30)
CREAT SERPL-MCNC: 3.2 MG/DL (ref 0.5–1)
EOSINOPHIL NFR BLD AUTO: 2 % (ref 0–8)
ERYTHROCYTE [DISTWIDTH] IN BLOOD BY AUTOMATED COUNT: 15.4 % (ref 11.5–15.5)
HCT VFR BLD AUTO: 27 % (ref 37–47)
HGB BLD-MCNC: 9.2 G/DL (ref 12–16)
IMM GRANULOCYTES NFR BLD: 0.3 % (ref 0–5)
LIPASE SERPL-CCNC: 66 U/L (ref 23–300)
LYMPHOCYTES NFR BLD: 29 % (ref 15–41)
MAGNESIUM SERPL-MCNC: 2.2 MG/DL (ref 1.6–2.3)
MCH RBC QN AUTO: 28.3 PG  CALC (ref 26–32)
MCHC RBC AUTO-ENTMCNC: 34.1 G/L CALC (ref 32–36)
MCV RBC AUTO: 83.1 FL  CALC (ref 80–100)
MONOCYTES NFR BLD AUTO: 11 % (ref 2–13)
NEUTROPHILS # BLD AUTO: 3.41 THOU/UL (ref 2–7.15)
NEUTROPHILS NFR BLD AUTO: 57 % (ref 42–76)
PLATELET # BLD AUTO: 272 THOU/UL (ref 130–400)
POTASSIUM SERPL-SCNC: 5.3 MMOL/L (ref 3.5–5.1)
PROT SERPL-MCNC: 7.9 G/DL (ref 6.3–8.2)
RBC # BLD AUTO: 3.25 MILL/UL (ref 4.2–5.6)
SODIUM SERPL-SCNC: 139 MMOL/L (ref 137–146)

## 2019-03-26 NOTE — NUR
PT REPORT RECIEVED FROM ALEXASNDRA BAILEY RN. PT RESTING. NO S/S OF DISTRESS. CALL
LIGHT IN REACH. WILL CONTINUE TO MONITOR.

## 2019-03-26 NOTE — NUR
PT A/O X4. SPEECH IS CLEAR. RESP EVEN AND UNLABORED. LUNG SOUNDS CLEAR. BOWEL
SOUNDS ACTIVE X4. STRONG RADIAL AND PEDAL PULSES. #20 RAC SL. FLUSHED AND
PATENT. SITE APPEARS HEALTHY. SKIN INTACT. PT C/O ACHING PAIN TO RT RIBS AND
BACK; 10 OUT OF 10 ON PAIN SCALE. MEDICATED W/ 50 MG ULTRAM PO. SLIDE UP IN
BED W/ ASSISTANCE OF WRITER AND HARRISON GEE FOR COMFORT. PT DENIES ANY FURTHER
NEEDS. POC DISCUSSED. SAFETY PRECAUTIONS IN PLACE. CALL LIGHT IN REACH. WILL
CONTINUE TO MONITOR.

## 2019-03-26 NOTE — NUR
ASSESSMENT COMPLETED; PT,. REPORTS SOB WITH MOVEMENT; O2 INFUSING PER NC
@2LITERS/MIN FOR PT'S COMFORT; SPO2 WNL; C/O LEFT RIB PAIN 10/10 AND B/P
ELEVATED /89; MEDICATED WITH ORDERED PRN LORTAB AND APRESOLINE ALONG
WITH COREG; WILL REASSESS;PT. IS ENCOURAGED TO DEEP BREATHE INTERMITTENTLY TO
ASSIST WITH LUNG EXPANSION;  ASSISTED PT. ON AND OFF OF BEDPAN AND PT. ABLE TO
PULL HERSELF IN BED; LINENS CHANGED AND ELLA CARE GIVEN; ENCOURAGED TO CALL
FOR ANY NEEDS; CALL LIGHT IS IN REACH; WILL CONTINUE TO MONITOR.

## 2019-03-26 NOTE — NUR
INFORMED DR. MIRANDA ABOUT PATIENTS BLOOD PRESSURE 160/98, NO NEW ORDERS MADE
AND INFORMED ABOUT PATIENTS REQUEST FOR A SLEEP AIDE, ORDERS FAX TO PHARMACY.

## 2019-03-26 NOTE — NUR
PT STATES PAIN HAS DECREASED BUT STILL REQUESTING MEDICATION. MED
ADMINISTRATION DISCUSSED W/ PT. PT STATES UNDERSTANDING. HEATING AND COLD PACK
GIVEN FOR COMFORT. PT DENIES ANY FURTHER NEEDS. WILL CONTINUE TO MONITOR.

## 2019-03-26 NOTE — NUR
REASSESSED B/P AND NOW /65; PT. C/O LEFT RIB PAIN 5/10; MEDICATED WITH
SCHEDULED TRAMADOL; WILL REASSESS; PT. ASSISTED ON AND OFF OF BEDPAN; PO
FLUIDS OFFERED; CALL LIGHT IS IN REACH.

## 2019-03-27 VITALS — DIASTOLIC BLOOD PRESSURE: 84 MMHG | SYSTOLIC BLOOD PRESSURE: 183 MMHG

## 2019-03-27 VITALS — DIASTOLIC BLOOD PRESSURE: 78 MMHG | SYSTOLIC BLOOD PRESSURE: 157 MMHG

## 2019-03-27 VITALS — SYSTOLIC BLOOD PRESSURE: 149 MMHG | DIASTOLIC BLOOD PRESSURE: 72 MMHG

## 2019-03-27 VITALS — SYSTOLIC BLOOD PRESSURE: 125 MMHG | DIASTOLIC BLOOD PRESSURE: 70 MMHG

## 2019-03-27 VITALS — SYSTOLIC BLOOD PRESSURE: 175 MMHG | DIASTOLIC BLOOD PRESSURE: 68 MMHG

## 2019-03-27 VITALS — DIASTOLIC BLOOD PRESSURE: 84 MMHG | SYSTOLIC BLOOD PRESSURE: 176 MMHG

## 2019-03-27 VITALS — DIASTOLIC BLOOD PRESSURE: 67 MMHG | SYSTOLIC BLOOD PRESSURE: 149 MMHG

## 2019-03-27 LAB
ALBUMIN SERPL-MCNC: 3.9 G/DL (ref 3.2–5)
ALP SERPL-CCNC: 104 U/L (ref 38–126)
AMYLASE SERPL-CCNC: 123 U/L (ref 30–110)
ANION GAP SERPL CALCULATED.3IONS-SCNC: 17 MMOL/L
AST SERPL-CCNC: 19 U/L (ref 9–36)
BASOPHILS NFR BLD AUTO: 0 % (ref 0–3)
BUN SERPL-MCNC: 63 MG/DL (ref 8–23)
BUN/CREAT SERPL: 19
CHLORIDE SERPL-SCNC: 111 MMOL/L (ref 95–108)
CO2 SERPL-SCNC: 14 MMOL/L (ref 22–30)
CREAT SERPL-MCNC: 3.4 MG/DL (ref 0.5–1)
EOSINOPHIL NFR BLD AUTO: 1 % (ref 0–8)
ERYTHROCYTE [DISTWIDTH] IN BLOOD BY AUTOMATED COUNT: 15.1 % (ref 11.5–15.5)
HCT VFR BLD AUTO: 26.4 % (ref 37–47)
HGB BLD-MCNC: 9.2 G/DL (ref 12–16)
IMM GRANULOCYTES NFR BLD: 0.3 % (ref 0–5)
LIPASE SERPL-CCNC: 54 U/L (ref 23–300)
LYMPHOCYTES NFR BLD: 20 % (ref 15–41)
MAGNESIUM SERPL-MCNC: 2.2 MG/DL (ref 1.6–2.3)
MCH RBC QN AUTO: 28.8 PG  CALC (ref 26–32)
MCHC RBC AUTO-ENTMCNC: 34.8 G/L CALC (ref 32–36)
MCV RBC AUTO: 82.5 FL  CALC (ref 80–100)
MONOCYTES NFR BLD AUTO: 12 % (ref 2–13)
NEUTROPHILS # BLD AUTO: 4.63 THOU/UL (ref 2–7.15)
NEUTROPHILS NFR BLD AUTO: 67 % (ref 42–76)
PLATELET # BLD AUTO: 284 THOU/UL (ref 130–400)
POTASSIUM SERPL-SCNC: 5 MMOL/L (ref 3.5–5.1)
PROT SERPL-MCNC: 7.8 G/DL (ref 6.3–8.2)
RBC # BLD AUTO: 3.2 MILL/UL (ref 4.2–5.6)
SODIUM SERPL-SCNC: 137 MMOL/L (ref 137–146)

## 2019-03-27 NOTE — NUR
PT ASSISTED TO THE CHAIR, "STATED I CAN'T , ENCOURAGED TO YE YOU CAN I HAVE
SIMEON IN YOU" PT IN THE CHAIR. CONTINUE TO OSBERVE AND MONITOR.

## 2019-03-27 NOTE — NUR
ASSESSMENT COMPLETED; NO RESP DISTRESS NOTED; PT. ON RA; ASSISTED ON AND OFF
OF BEDPAN; PT. VOIDED 250MLS OF CLEAR YELLOW URINE; ENCOURAGED TO REPOSITION
SELF AND DEEP BREATHE AS WELL AS USING THE INCENTIVE SPIROMETER; PT. ABLE TO
PULL HERSELF UP IN BED; PO FLUIDS OFFERED; MEDICATED WITH ORDERED COREG AND
APRESOLINE AT THIS TIME FOR ELEVATED B/P; WILL CONTINUE TO MONITOR. CALL LIGHT
IS IN REACH; WILL CONTINUE TO MONITOR.

## 2019-03-27 NOTE — NUR
IV SITE TO RAC INFILTRATED; REMOVED AND CATHETER TIP INTACT; WARM PACK
APPLIED; NEW IV STARTED TO  #24 GAUGE X2 ATTEMPT;

## 2019-03-27 NOTE — NUR
PT. C/O LEFT RIB PAIN; MEDICATED WITH ORDERED LORTAB; WILL REASSESS; SNACK
PROVIDED; CALL LIGHT IS IN REACH.

## 2019-03-27 NOTE — NUR
PT. RESTING IN  BED WITH NO DISTRESS NOTED; RESP EVEN AND UNLABORED; NEW BAG
OF ORDERED IVF HUNG; ENCOURAGED TO CALL FOR ANY NEEDS; CALL LIGHT IS IN REACH.

## 2019-03-27 NOTE — NUR
PT. C/O LEFT RIB PAIN 7/10; MEDICATED WITH ORDERED LORTAB; WILL REASSESS; PT.
GIVEN AND INCENTIVE SPIROMETER AND THIS WRITER GAVE INSTRUCTION OF USE; PT.
REPORTS SHE DOESNT THINK SHE CAN DO THAT RIGHT NOW, BUT VERBALIZES
UNDERSTANDING OF USAGE; SET AT BEDSIDE AND PT. IN ENCOURAGED TO USE; JUICE
PROVIDED; CALL LIGHT IS IN REACH; WILL CONTINUE TO MONITOR.

## 2019-03-27 NOTE — NUR
ASSESSMENT IS COMPLETED: IV SITE IS FREE FROM REDNESS OR EDEMA. HR IS
REG,PULSES ARE STRONG X4, ABD IS SOFT WITH ACTIVE BS. BREATH SOUNDS ARE CLEAR,
BILATERALLY. TELE MONITOR IN PLACE. CONTINUE TO OBSERVE AND MONITOR. PT C/O
RIB PAIN.

## 2019-03-27 NOTE — NUR
PT. REPORTING LEFT RIB PAIN 9/10 AND B/P IS ELEVATED; MEDICATED WITH ORDERED
PRN LORTAB; WILL REASSESS PAIN AND B/P; PO FLUIDS OFFERED; DENIES FURTHER
NEEDS; CALL LIGHT IS IN REACH.

## 2019-03-27 NOTE — NUR
PT IS RESTING IN BED , ENCOURAGING PT TO GET OUT OF BED PER REQUEST FROM
DR. ANDERSON. C/O PAIN. CONTINUE TO OBSERVE AND MONITOR.

## 2019-03-27 NOTE — NUR
PT HAS BEEN SITTING IN THE CHAIR, IV SITE IS FREE FROM REDNESS OR EDEMA.
CONTINUE TO OSBERVE AND MONITOR.

## 2019-03-28 VITALS — SYSTOLIC BLOOD PRESSURE: 195 MMHG | DIASTOLIC BLOOD PRESSURE: 81 MMHG

## 2019-03-28 VITALS — SYSTOLIC BLOOD PRESSURE: 154 MMHG | DIASTOLIC BLOOD PRESSURE: 73 MMHG

## 2019-03-28 VITALS — DIASTOLIC BLOOD PRESSURE: 82 MMHG | SYSTOLIC BLOOD PRESSURE: 178 MMHG

## 2019-03-28 VITALS — DIASTOLIC BLOOD PRESSURE: 75 MMHG | SYSTOLIC BLOOD PRESSURE: 150 MMHG

## 2019-03-28 VITALS — SYSTOLIC BLOOD PRESSURE: 188 MMHG | DIASTOLIC BLOOD PRESSURE: 82 MMHG

## 2019-03-28 VITALS — DIASTOLIC BLOOD PRESSURE: 84 MMHG | SYSTOLIC BLOOD PRESSURE: 170 MMHG

## 2019-03-28 VITALS — DIASTOLIC BLOOD PRESSURE: 92 MMHG | SYSTOLIC BLOOD PRESSURE: 174 MMHG

## 2019-03-28 LAB
ALBUMIN SERPL-MCNC: 3.5 G/DL (ref 3.2–5)
ALP SERPL-CCNC: 96 U/L (ref 38–126)
ANION GAP SERPL CALCULATED.3IONS-SCNC: 17 MMOL/L
AST SERPL-CCNC: 16 U/L (ref 9–36)
BASOPHILS NFR BLD AUTO: 0 % (ref 0–3)
BUN SERPL-MCNC: 55 MG/DL (ref 8–23)
BUN/CREAT SERPL: 18
CHLORIDE SERPL-SCNC: 112 MMOL/L (ref 95–108)
CO2 SERPL-SCNC: 15 MMOL/L (ref 22–30)
CREAT SERPL-MCNC: 3.1 MG/DL (ref 0.5–1)
EOSINOPHIL NFR BLD AUTO: 1 % (ref 0–8)
ERYTHROCYTE [DISTWIDTH] IN BLOOD BY AUTOMATED COUNT: 15.3 % (ref 11.5–15.5)
HCT VFR BLD AUTO: 23.3 % (ref 37–47)
HGB BLD-MCNC: 8.1 G/DL (ref 12–16)
IMM GRANULOCYTES NFR BLD: 0.4 % (ref 0–5)
LYMPHOCYTES NFR BLD: 20 % (ref 15–41)
MAGNESIUM SERPL-MCNC: 2.1 MG/DL (ref 1.6–2.3)
MCH RBC QN AUTO: 28.5 PG  CALC (ref 26–32)
MCHC RBC AUTO-ENTMCNC: 34.8 G/L CALC (ref 32–36)
MCV RBC AUTO: 82 FL  CALC (ref 80–100)
MONOCYTES NFR BLD AUTO: 11 % (ref 2–13)
NEUTROPHILS # BLD AUTO: 4.98 THOU/UL (ref 2–7.15)
NEUTROPHILS NFR BLD AUTO: 68 % (ref 42–76)
PLATELET # BLD AUTO: 247 THOU/UL (ref 130–400)
POTASSIUM SERPL-SCNC: 4.7 MMOL/L (ref 3.5–5.1)
PROT SERPL-MCNC: 7.3 G/DL (ref 6.3–8.2)
RBC # BLD AUTO: 2.84 MILL/UL (ref 4.2–5.6)
SODIUM SERPL-SCNC: 139 MMOL/L (ref 137–146)

## 2019-03-28 NOTE — NUR
CALLED LUCY SMALL AT [facility] AND GAVE REPORT. INFORMED THAT PT WAS ON
HER WAY. VERBALIZED UNDERSTANDING. IV SITE IS FREE FROM REDNESS OR EDMEA.

## 2019-03-28 NOTE — NUR
ASSESSMENT IS COMPLETED: IV SITE IS FREE FROM REDNESS OR EDEMA. HR IS
REG,PULSES ARE STRONG X4, ABD IS SOFT WITH ACTIVE BS. BREATH SOUNDS ARE
CLEAR,BILATERALLY. TELE MONITOR IN PLACE. CONTINUE TO OBSERVE AND MONITOR.

## 2019-03-28 NOTE — NUR
SPOKE WITH \[facility]\"" THEY WILL BE HERE SOON TO TRANSPORT PT TO [facility]

## 2019-03-28 NOTE — NUR
PT IS IN THE CHAIR, WANTS TO GO BACK TO BED, INSTRUCTED HER SHE NEEDS TO GET
OUT OF BED. USED THE BSC

## 2019-03-28 NOTE — NUR
MANUAL B/P 188/82 AND PT. C/O LEFT RIB PAIN 6/10; MEDICATED WITH ORDERED
LORTAB AND APRESOLINE IV AS PER ORDER; WILL REASSESS; DENIES FURTHER NEEDS;
CALL LIGHT IS IN REACH.

## 2019-03-28 NOTE — NUR
REASSESSED MANUALLY AND B/P AND NOW B/P DOWN /84; PT. DENIES NEEDS AT
THIS TIME; CALL LIGHT IS IN REACH; WILL CONTINUE TO MONITOR.

## 2019-03-28 NOTE — NUR
PT. MEDICATED WITH ORDERED LABETOLOL FOR ELEVATED B/P; WILL REASSESS; PT.
DENIES NEEDS; CALL LIGHT IS IN REACH.

## 2019-03-28 NOTE — NUR
INFORMED JAGRUTI HER DAUGHTER OF PT BEING SENT TO [facility], VERBALIZED
THANKS ALSO INFORMED THAT PT WAS UP IN THE CHAIR

## 2019-03-28 NOTE — NUR
NOTIFIED DR. MIRANDA OF B/P REMAINING ELEVATED /82 MANUALLY  AND HR
86 POST APRESOLINE ADMINISTRATION AT 0100; NEW ORDERS RECEIVED AND TO BE
CARRIED OUT.

## 2019-03-28 NOTE — NUR
SPOKE WITH JAGRUTI DAUGHTER OF PATIENT/ INQUIRING ABOUT [employer].
INFORMED THIS WRITER OF PT'S BROTHER PASSED AWAY AT [facility]. EXPLAINED
WHAT WAS NEEDED. AND WILL LET HER KNOW WHEN PT LEAVES.

## 2019-03-28 NOTE — NUR
MEDICATED WITH ORDERED APRESOLINE FOR B/P 174/92; WILL REASSESS; PT. ASSISTED
ON AND OFF OF BEDPAN; PT. DECLINES WANTING TO GET OOB TO USE BSC; ENCOURAGED
TO REPOSITION IN BED; PO FLUIDS OFFERED; CALL LIGHT IS IN REACH.

## 2019-03-28 NOTE — NUR
Discharge instructions given. Patient verbalizes understanding of same.
Discharged in stable condition via Medical Transport to *Other with
*Other. All belongings sent with pt.HUGO KHAN AND WAS TAKEN BY [facility].

## 2019-03-28 NOTE — NUR
B/P REASSESSED AND /92; CALLED MD ON CALL AND NOTIFIED HIM OF ALL
PREVIOUS B/P MEDICATIONS PT. RECEIVED THIS SHIFT; NEW ORDERS RECEIVED AND TO
BE CARRIED OUT.

## 2019-04-03 ENCOUNTER — HOSPITAL ENCOUNTER (EMERGENCY)
Dept: HOSPITAL 82 - ED | Age: 64
Discharge: HOME | End: 2019-04-03
Payer: COMMERCIAL

## 2019-04-03 VITALS — SYSTOLIC BLOOD PRESSURE: 129 MMHG | DIASTOLIC BLOOD PRESSURE: 59 MMHG

## 2019-04-03 VITALS — WEIGHT: 154.32 LBS | HEIGHT: 65 IN | BODY MASS INDEX: 25.71 KG/M2

## 2019-04-03 DIAGNOSIS — F17.200: ICD-10-CM

## 2019-04-03 DIAGNOSIS — S22.42XA: Primary | ICD-10-CM

## 2019-04-03 DIAGNOSIS — I10: ICD-10-CM

## 2019-04-03 DIAGNOSIS — E11.9: ICD-10-CM
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
## 2019-04-03 LAB
ALBUMIN SERPL-MCNC: 4.2 G/DL (ref 3.2–5)
ALP SERPL-CCNC: 99 U/L (ref 38–126)
ANION GAP SERPL CALCULATED.3IONS-SCNC: 19 MMOL/L
AST SERPL-CCNC: 23 U/L (ref 9–36)
BASOPHILS NFR BLD AUTO: 1 % (ref 0–3)
BUN SERPL-MCNC: 30 MG/DL (ref 8–23)
BUN/CREAT SERPL: 11
CHLORIDE SERPL-SCNC: 111 MMOL/L (ref 95–108)
CO2 SERPL-SCNC: 19 MMOL/L (ref 22–30)
CREAT SERPL-MCNC: 2.7 MG/DL (ref 0.5–1)
EOSINOPHIL NFR BLD AUTO: 2 % (ref 0–8)
ERYTHROCYTE [DISTWIDTH] IN BLOOD BY AUTOMATED COUNT: 15.3 % (ref 11.5–15.5)
HCT VFR BLD AUTO: 31.2 % (ref 37–47)
HGB BLD-MCNC: 10.8 G/DL (ref 12–16)
IMM GRANULOCYTES NFR BLD: 0.5 % (ref 0–5)
LYMPHOCYTES NFR BLD: 22 % (ref 15–41)
MCH RBC QN AUTO: 28.9 PG  CALC (ref 26–32)
MCHC RBC AUTO-ENTMCNC: 34.6 G/L CALC (ref 32–36)
MCV RBC AUTO: 83.4 FL  CALC (ref 80–100)
MONOCYTES NFR BLD AUTO: 12 % (ref 2–13)
NEUTROPHILS # BLD AUTO: 5.11 THOU/UL (ref 2–7.15)
NEUTROPHILS NFR BLD AUTO: 63 % (ref 42–76)
PLATELET # BLD AUTO: 336 THOU/UL (ref 130–400)
POTASSIUM SERPL-SCNC: 5 MMOL/L (ref 3.5–5.1)
PROT SERPL-MCNC: 8.5 G/DL (ref 6.3–8.2)
RBC # BLD AUTO: 3.74 MILL/UL (ref 4.2–5.6)
SODIUM SERPL-SCNC: 144 MMOL/L (ref 137–146)

## 2019-04-09 ENCOUNTER — HOSPITAL ENCOUNTER (OUTPATIENT)
Dept: HOSPITAL 82 - ED | Age: 64
Setting detail: OBSERVATION
Discharge: LEFT BEFORE DISCHARGE | End: 2019-04-09
Attending: INTERNAL MEDICINE | Admitting: INTERNAL MEDICINE
Payer: COMMERCIAL

## 2019-04-09 VITALS — HEIGHT: 64 IN | WEIGHT: 131.37 LBS | BODY MASS INDEX: 22.43 KG/M2

## 2019-04-09 VITALS — SYSTOLIC BLOOD PRESSURE: 201 MMHG | DIASTOLIC BLOOD PRESSURE: 91 MMHG

## 2019-04-09 VITALS — DIASTOLIC BLOOD PRESSURE: 76 MMHG | SYSTOLIC BLOOD PRESSURE: 166 MMHG

## 2019-04-09 VITALS — SYSTOLIC BLOOD PRESSURE: 193 MMHG | DIASTOLIC BLOOD PRESSURE: 89 MMHG

## 2019-04-09 VITALS — SYSTOLIC BLOOD PRESSURE: 192 MMHG | DIASTOLIC BLOOD PRESSURE: 88 MMHG

## 2019-04-09 VITALS — SYSTOLIC BLOOD PRESSURE: 186 MMHG | DIASTOLIC BLOOD PRESSURE: 88 MMHG

## 2019-04-09 VITALS — DIASTOLIC BLOOD PRESSURE: 73 MMHG | SYSTOLIC BLOOD PRESSURE: 181 MMHG

## 2019-04-09 VITALS — SYSTOLIC BLOOD PRESSURE: 212 MMHG | DIASTOLIC BLOOD PRESSURE: 93 MMHG

## 2019-04-09 VITALS — SYSTOLIC BLOOD PRESSURE: 210 MMHG | DIASTOLIC BLOOD PRESSURE: 91 MMHG

## 2019-04-09 VITALS — DIASTOLIC BLOOD PRESSURE: 80 MMHG | SYSTOLIC BLOOD PRESSURE: 176 MMHG

## 2019-04-09 VITALS — SYSTOLIC BLOOD PRESSURE: 148 MMHG | DIASTOLIC BLOOD PRESSURE: 68 MMHG

## 2019-04-09 VITALS — SYSTOLIC BLOOD PRESSURE: 159 MMHG | DIASTOLIC BLOOD PRESSURE: 74 MMHG

## 2019-04-09 VITALS — DIASTOLIC BLOOD PRESSURE: 93 MMHG | SYSTOLIC BLOOD PRESSURE: 208 MMHG

## 2019-04-09 VITALS — SYSTOLIC BLOOD PRESSURE: 208 MMHG | DIASTOLIC BLOOD PRESSURE: 91 MMHG

## 2019-04-09 VITALS — SYSTOLIC BLOOD PRESSURE: 165 MMHG | DIASTOLIC BLOOD PRESSURE: 79 MMHG

## 2019-04-09 VITALS — SYSTOLIC BLOOD PRESSURE: 162 MMHG | DIASTOLIC BLOOD PRESSURE: 77 MMHG

## 2019-04-09 VITALS — DIASTOLIC BLOOD PRESSURE: 76 MMHG | SYSTOLIC BLOOD PRESSURE: 162 MMHG

## 2019-04-09 VITALS — DIASTOLIC BLOOD PRESSURE: 82 MMHG | SYSTOLIC BLOOD PRESSURE: 183 MMHG

## 2019-04-09 VITALS — DIASTOLIC BLOOD PRESSURE: 89 MMHG | SYSTOLIC BLOOD PRESSURE: 197 MMHG

## 2019-04-09 VITALS — SYSTOLIC BLOOD PRESSURE: 199 MMHG | DIASTOLIC BLOOD PRESSURE: 89 MMHG

## 2019-04-09 VITALS — DIASTOLIC BLOOD PRESSURE: 94 MMHG | SYSTOLIC BLOOD PRESSURE: 204 MMHG

## 2019-04-09 VITALS — SYSTOLIC BLOOD PRESSURE: 150 MMHG | DIASTOLIC BLOOD PRESSURE: 71 MMHG

## 2019-04-09 VITALS — DIASTOLIC BLOOD PRESSURE: 91 MMHG | SYSTOLIC BLOOD PRESSURE: 196 MMHG

## 2019-04-09 VITALS — SYSTOLIC BLOOD PRESSURE: 145 MMHG | DIASTOLIC BLOOD PRESSURE: 67 MMHG

## 2019-04-09 VITALS — SYSTOLIC BLOOD PRESSURE: 172 MMHG | DIASTOLIC BLOOD PRESSURE: 81 MMHG

## 2019-04-09 VITALS — DIASTOLIC BLOOD PRESSURE: 92 MMHG | SYSTOLIC BLOOD PRESSURE: 212 MMHG

## 2019-04-09 VITALS — SYSTOLIC BLOOD PRESSURE: 182 MMHG | DIASTOLIC BLOOD PRESSURE: 86 MMHG

## 2019-04-09 VITALS — DIASTOLIC BLOOD PRESSURE: 90 MMHG | SYSTOLIC BLOOD PRESSURE: 197 MMHG

## 2019-04-09 VITALS — DIASTOLIC BLOOD PRESSURE: 83 MMHG | SYSTOLIC BLOOD PRESSURE: 187 MMHG

## 2019-04-09 VITALS — SYSTOLIC BLOOD PRESSURE: 168 MMHG | DIASTOLIC BLOOD PRESSURE: 79 MMHG

## 2019-04-09 DIAGNOSIS — E11.22: ICD-10-CM

## 2019-04-09 DIAGNOSIS — J44.9: ICD-10-CM

## 2019-04-09 DIAGNOSIS — G89.29: ICD-10-CM

## 2019-04-09 DIAGNOSIS — D64.9: ICD-10-CM

## 2019-04-09 DIAGNOSIS — Z91.128: ICD-10-CM

## 2019-04-09 DIAGNOSIS — Z95.820: ICD-10-CM

## 2019-04-09 DIAGNOSIS — N18.4: ICD-10-CM

## 2019-04-09 DIAGNOSIS — I16.1: Primary | ICD-10-CM

## 2019-04-09 DIAGNOSIS — E78.5: ICD-10-CM

## 2019-04-09 DIAGNOSIS — T46.5X6A: ICD-10-CM
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
## 2019-04-09 DIAGNOSIS — F17.200: ICD-10-CM

## 2019-04-09 DIAGNOSIS — I12.9: ICD-10-CM

## 2019-04-09 DIAGNOSIS — B19.20: ICD-10-CM

## 2019-04-09 DIAGNOSIS — E11.21: ICD-10-CM

## 2019-04-09 DIAGNOSIS — K21.9: ICD-10-CM

## 2019-04-09 DIAGNOSIS — E11.51: ICD-10-CM

## 2019-04-09 DIAGNOSIS — K74.60: ICD-10-CM

## 2019-04-09 DIAGNOSIS — S22.42XD: ICD-10-CM

## 2019-04-09 LAB
ALBUMIN SERPL-MCNC: 4.6 G/DL (ref 3.2–5)
ALP SERPL-CCNC: 140 U/L (ref 38–126)
ANION GAP SERPL CALCULATED.3IONS-SCNC: 20 MMOL/L
AST SERPL-CCNC: 52 U/L (ref 9–36)
BARBITURATES UR-MCNC: NEGATIVE UG/ML
BASOPHILS NFR BLD AUTO: 1 % (ref 0–3)
BILIRUB UR QL STRIP.AUTO: NEGATIVE
BUN SERPL-MCNC: 36 MG/DL (ref 8–23)
BUN/CREAT SERPL: 13
CHLORIDE SERPL-SCNC: 114 MMOL/L (ref 95–108)
CO2 SERPL-SCNC: 15 MMOL/L (ref 22–30)
COCAINE UR-MCNC: NEGATIVE NG/ML
COLOR UR AUTO: YELLOW
CREAT SERPL-MCNC: 2.8 MG/DL (ref 0.5–1)
EOSINOPHIL NFR BLD AUTO: 2 % (ref 0–8)
ERYTHROCYTE [DISTWIDTH] IN BLOOD BY AUTOMATED COUNT: 15.8 % (ref 11.5–15.5)
GLUCOSE UR STRIP.AUTO-MCNC: NEGATIVE MG/DL
HCT VFR BLD AUTO: 32.6 % (ref 37–47)
HGB BLD-MCNC: 11.2 G/DL (ref 12–16)
HGB UR QL STRIP.AUTO: (no result)
IMM GRANULOCYTES NFR BLD: 0.3 % (ref 0–5)
KETONES UR STRIP.AUTO-MCNC: NEGATIVE MG/DL
LEUKOCYTE ESTERASE UR QL STRIP.AUTO: NEGATIVE
LYMPHOCYTES NFR BLD: 21 % (ref 15–41)
MCH RBC QN AUTO: 28.6 PG  CALC (ref 26–32)
MCHC RBC AUTO-ENTMCNC: 34.4 G/L CALC (ref 32–36)
MCV RBC AUTO: 83.2 FL  CALC (ref 80–100)
METHADONE SERPL-MCNC: NEGATIVE NG/ML
MONOCYTES NFR BLD AUTO: 10 % (ref 2–13)
NEUTROPHILS # BLD AUTO: 5.21 THOU/UL (ref 2–7.15)
NEUTROPHILS NFR BLD AUTO: 66 % (ref 42–76)
NITRITE UR QL STRIP.AUTO: NEGATIVE
OXCYCODONE: NEGATIVE
PH UR STRIP.AUTO: 5.5 [PH] (ref 4.5–8)
PLATELET # BLD AUTO: 418 THOU/UL (ref 130–400)
POTASSIUM SERPL-SCNC: 4.8 MMOL/L (ref 3.5–5.1)
PROT SERPL-MCNC: 9.7 G/DL (ref 6.3–8.2)
PROT UR QL STRIP.AUTO: >=300 MG/DL
RBC # BLD AUTO: 3.92 MILL/UL (ref 4.2–5.6)
RBC #/AREA URNS HPF: (no result) RBC/HPF (ref 0–5)
SODIUM SERPL-SCNC: 144 MMOL/L (ref 137–146)
SP GR UR STRIP.AUTO: >=1.03
SQUAMOUS URNS QL MICRO: (no result) EPI/HPF
TETRAHYDROCANNABIONOL: NEGATIVE
TRICYLIC ANTIDEPRESSANTS: NEGATIVE
UROBILINOGEN UR QL STRIP.AUTO: 0.2 E.U./DL
WBC #/AREA URNS HPF: (no result) WBC/HPF (ref 0–5)

## 2019-04-09 NOTE — NUR
pt left AMA in stable condition; this writer spoke with pt and daughter
Dianna in great detail in regards to severely elevated BP, risk of
deteroriation in condition and stroke; pt and daughter informed to return to
ER immed/ call 911 for symptoms of stroke; pt very upset; pt informed daughter
staff has waited all day to order Morphine; daughter informed pt has been
medicated with tramadol and morphine was offered/ pt requested morphine prior
to leaving AMA; pt also states physician called her a "crack head";

## 2019-04-09 NOTE — NUR
IV TAKEN OFF. DRSSING APPLIED. DRIP TAKEN OFF AND NORMAL SALINE AS WELL.
DAUGHTER HERE AT BEDSIDE. PATIENT AWARE BLOOD PRESSURE IS STILL HIGH. PATIENT
SAFELY TRASNFERRED IN WHEELCHAIR WITH DAUGHTER AND NURSING STAFF TO CAR.

## 2019-04-09 NOTE — NUR
PATIENT REQUESTING MORE PAIN MEDICATION. PATIENT THREATENING SHE WILL LEAVCE
AMA. DR ZELEDON CALLED AND RECEIVED ORDER FOR MORPHINE 2MG IV Q4H FOR SEVER
PAIN. PATIENT ON CALL LIGHT AND NOTIFIED ME SHE WILL BE LEAVING AMA. I LET HER
KNOW THAT PAIN MEDICATION WAS ORDERED FOR HER, SHE THAN ASKED IF SHE COULD
HAVE IT BEFORE SHE LEFT AMA. I LET HER KNOW SHE CANNOT HAVE IT IF SHE IS
LEAVING AMA. I EDUCATED PATIENT ON RISKS AND DETERIORATIONS OF
LEAVING AMA WITH HIGH BLOOD PRESSURE. PATIENT STILL ADIMENT TO LEAVE. SHE
REPORTED DAUGHTER IS ON HER WAY.

## 2019-04-09 NOTE — NUR
DR ANDERSON IN ROOM TO ASSESS PATIENT AS WELL AS DISCUSS PLAN OF CARE. DR ANDERSON GAVE ORDER TO KEEP PATIENT'S BLOOD PRESSURE IN  'S SYSTOLIC.
CARDENE DRIP TITRATED FROM 15MG/HR TO 10MG/HR PER DR ANDERSON. DR ANDERSON SPOKE
TO PATIENT ABOUT PAIN MANAGEMENT AND WHAT MEDICATION HAS BEEN GIVEN FOR PAIN,
DR ANDERSON INSTRUCTED TO USE HEAT OR COLD THERAPY FOR PAIN. PATIENT DENIES SHE
HAS KIDNEY ISSUES. CALL LIGHT WITHIN REACH. NURSING STAFF EDUCATED PATIENT AND
OFFERED HEATING PAD. CALL LIGHT WITHIN REACH.

## 2019-04-09 NOTE — NUR
PT STATES SHE IS HERE FOR PAIN CONTROL AND THAT THE  AT [facility] GIVE HER THE PAIN MEDS THAT WE CAN.  WAS TOLD BY DR. HINTON TO GO TO A PAIN
MANAGEMENT DOCTOR BUT SHE HASNT TRIED TO SET UP AN APPT YET.

## 2019-04-09 NOTE — NUR
Patient decides to leave AMA.  Multiple attempts made to ecourage patient to
remain here for continued treatment.  Explained to patient all risks of
leaving
against medical advice including death. Pt verbalized understanding of all
risks.  Pt also encouraged to return to [facility] at any
time, especially if symptoms continue or become worse.  Pt verbalized
understanding.

## 2019-04-09 NOTE — NUR
PT STATES HAS RAN OUT OF PAIN MEDICATIONS, BUT UNABLE TO OBTAIN MEDICATION
RECONCILLIATION, PT STATES THAT SHE DOESNT REMEMBER HER MED LIST.

## 2019-04-09 NOTE — NUR
female pt received to ICU bed 2 via stretcher accompanied by VALORIE Rodriguez RN in
stable condition; pt transferred to bed with staff assistance; weight obtained
via bed scale; admission assessment completed at this time; pt alert and
oriented; c/c of "left rib cage pain"; pt states she fell over the bath tub a
week ago and still has ongoing pain/ she ran out of her pain meds; rates pain
as sharp 9/10; prev medicated in ER; resp unlabored; ra; hr reg; sr on
monitor; abd nondistended; no urine to inspect at this time; bsc; #20 to rh
patent with cardene gtt infusing at 15mg/hr and ns at kvo; pt admits to not
taking medication as prescribed at home; plan of care explained; call light
within reach; will continue to monitor

## 2019-04-18 ENCOUNTER — HOSPITAL ENCOUNTER (OUTPATIENT)
Dept: HOSPITAL 82 - ED | Age: 64
Setting detail: OBSERVATION
LOS: 5 days | Discharge: LEFT BEFORE DISCHARGE | End: 2019-04-23
Attending: INTERNAL MEDICINE | Admitting: INTERNAL MEDICINE
Payer: COMMERCIAL

## 2019-04-18 VITALS — BODY MASS INDEX: 22.16 KG/M2 | HEIGHT: 65 IN | WEIGHT: 133 LBS

## 2019-04-18 DIAGNOSIS — E11.51: ICD-10-CM

## 2019-04-18 DIAGNOSIS — K21.9: ICD-10-CM

## 2019-04-18 DIAGNOSIS — I12.9: ICD-10-CM

## 2019-04-18 DIAGNOSIS — D64.9: ICD-10-CM

## 2019-04-18 DIAGNOSIS — Z95.820: ICD-10-CM

## 2019-04-18 DIAGNOSIS — Z95.828: ICD-10-CM

## 2019-04-18 DIAGNOSIS — I25.10: ICD-10-CM

## 2019-04-18 DIAGNOSIS — T46.5X6A: ICD-10-CM

## 2019-04-18 DIAGNOSIS — E78.5: ICD-10-CM

## 2019-04-18 DIAGNOSIS — Z91.128: ICD-10-CM

## 2019-04-18 DIAGNOSIS — Z79.02: ICD-10-CM

## 2019-04-18 DIAGNOSIS — E11.22: ICD-10-CM

## 2019-04-18 DIAGNOSIS — R11.2: ICD-10-CM

## 2019-04-18 DIAGNOSIS — E87.2: ICD-10-CM

## 2019-04-18 DIAGNOSIS — J44.9: ICD-10-CM

## 2019-04-18 DIAGNOSIS — I16.1: Primary | ICD-10-CM

## 2019-04-18 DIAGNOSIS — R19.7: ICD-10-CM

## 2019-04-18 DIAGNOSIS — E11.21: ICD-10-CM

## 2019-04-18 DIAGNOSIS — B19.20: ICD-10-CM

## 2019-04-18 DIAGNOSIS — F17.200: ICD-10-CM

## 2019-04-18 DIAGNOSIS — N18.4: ICD-10-CM

## 2019-04-18 DIAGNOSIS — G89.29: ICD-10-CM

## 2019-04-18 LAB
ALBUMIN SERPL-MCNC: 4.6 G/DL (ref 3.2–5)
ALP SERPL-CCNC: 140 U/L (ref 38–126)
AMYLASE SERPL-CCNC: 138 U/L (ref 30–110)
ANION GAP SERPL CALCULATED.3IONS-SCNC: 21 MMOL/L
AST SERPL-CCNC: 21 U/L (ref 9–36)
BASOPHILS NFR BLD AUTO: 0 % (ref 0–3)
BILIRUB UR QL STRIP.AUTO: NEGATIVE
BUN SERPL-MCNC: 34 MG/DL (ref 8–23)
BUN/CREAT SERPL: 10
CHLORIDE SERPL-SCNC: 113 MMOL/L (ref 95–108)
CO2 SERPL-SCNC: 12 MMOL/L (ref 22–30)
COLOR UR AUTO: YELLOW
CREAT SERPL-MCNC: 3.3 MG/DL (ref 0.5–1)
EOSINOPHIL NFR BLD AUTO: 0 % (ref 0–8)
ERYTHROCYTE [DISTWIDTH] IN BLOOD BY AUTOMATED COUNT: 15.3 % (ref 11.5–15.5)
GLUCOSE UR STRIP.AUTO-MCNC: NEGATIVE MG/DL
HCT VFR BLD AUTO: 32.1 % (ref 37–47)
HGB BLD-MCNC: 11.4 G/DL (ref 12–16)
HGB UR QL STRIP.AUTO: (no result)
IMM GRANULOCYTES NFR BLD: 0.5 % (ref 0–5)
KETONES UR STRIP.AUTO-MCNC: NEGATIVE MG/DL
LEUKOCYTE ESTERASE UR QL STRIP.AUTO: NEGATIVE
LIPASE SERPL-CCNC: 35 U/L (ref 23–300)
LYMPHOCYTES NFR BLD: 5 % (ref 15–41)
MCH RBC QN AUTO: 28.6 PG  CALC (ref 26–32)
MCHC RBC AUTO-ENTMCNC: 35.5 G/L CALC (ref 32–36)
MCV RBC AUTO: 80.7 FL  CALC (ref 80–100)
MONOCYTES NFR BLD AUTO: 5 % (ref 2–13)
MYOGLOBIN SERPL-MCNC: 67 NG/ML (ref 0–62)
NEUTROPHILS # BLD AUTO: 13.1 THOU/UL (ref 2–7.15)
NEUTROPHILS NFR BLD AUTO: 89 % (ref 42–76)
NITRITE UR QL STRIP.AUTO: NEGATIVE
PH UR STRIP.AUTO: 5.5 [PH] (ref 4.5–8)
PLATELET # BLD AUTO: 364 THOU/UL (ref 130–400)
POTASSIUM SERPL-SCNC: 4.2 MMOL/L (ref 3.5–5.1)
PROT SERPL-MCNC: 9.4 G/DL (ref 6.3–8.2)
PROT UR QL STRIP.AUTO: >=300 MG/DL
RBC # BLD AUTO: 3.98 MILL/UL (ref 4.2–5.6)
RBC #/AREA URNS HPF: (no result) RBC/HPF (ref 0–5)
SODIUM SERPL-SCNC: 142 MMOL/L (ref 137–146)
SP GR UR STRIP.AUTO: >=1.03
SQUAMOUS URNS QL MICRO: (no result) EPI/HPF
UROBILINOGEN UR QL STRIP.AUTO: 0.2 E.U./DL

## 2019-04-18 PROCEDURE — S0164 INJECTION PANTROPRAZOLE: HCPCS

## 2019-04-18 PROCEDURE — G0378 HOSPITAL OBSERVATION PER HR: HCPCS

## 2019-04-18 NOTE — NUR
VOMITING AND DIRRAHEA BEGAN THIS MORNING. PT IS AOX4. PT DENIES ANY C/P, SOB,
N/V. PT STATES GENERALIZED WEAKNESS.

## 2019-04-18 NOTE — NUR
63 yr old black female adm icu7 per stretcher from er. transferred self with
much encouragement to bed. bed weight obtained. cardiac monitor shows sinus
rhythm. #22 rfa cardene gtt infusing @ 150cchr, ns infusung @ 20cchr. history
obtained per er record & pt. oriented to room. fall precautions initiated.

## 2019-04-18 NOTE — NUR
Admission Note
 
 
Report Given to:         GEOVANNY GOLDSMITH
Transported by:           Wheelchair          X Stretcher
 
Transported with:        X Nurse     Transporter   X Patent IV    O2
                         X Cardiac Monitor

## 2019-04-19 VITALS — SYSTOLIC BLOOD PRESSURE: 146 MMHG | DIASTOLIC BLOOD PRESSURE: 83 MMHG

## 2019-04-19 VITALS — SYSTOLIC BLOOD PRESSURE: 177 MMHG | DIASTOLIC BLOOD PRESSURE: 80 MMHG

## 2019-04-19 VITALS — DIASTOLIC BLOOD PRESSURE: 61 MMHG | SYSTOLIC BLOOD PRESSURE: 130 MMHG

## 2019-04-19 VITALS — DIASTOLIC BLOOD PRESSURE: 65 MMHG | SYSTOLIC BLOOD PRESSURE: 151 MMHG

## 2019-04-19 VITALS — DIASTOLIC BLOOD PRESSURE: 55 MMHG | SYSTOLIC BLOOD PRESSURE: 137 MMHG

## 2019-04-19 VITALS — SYSTOLIC BLOOD PRESSURE: 163 MMHG | DIASTOLIC BLOOD PRESSURE: 67 MMHG

## 2019-04-19 VITALS — SYSTOLIC BLOOD PRESSURE: 149 MMHG | DIASTOLIC BLOOD PRESSURE: 62 MMHG

## 2019-04-19 VITALS — SYSTOLIC BLOOD PRESSURE: 167 MMHG | DIASTOLIC BLOOD PRESSURE: 77 MMHG

## 2019-04-19 VITALS — DIASTOLIC BLOOD PRESSURE: 52 MMHG | SYSTOLIC BLOOD PRESSURE: 123 MMHG

## 2019-04-19 VITALS — DIASTOLIC BLOOD PRESSURE: 54 MMHG | SYSTOLIC BLOOD PRESSURE: 119 MMHG

## 2019-04-19 VITALS — SYSTOLIC BLOOD PRESSURE: 121 MMHG | DIASTOLIC BLOOD PRESSURE: 53 MMHG

## 2019-04-19 VITALS — DIASTOLIC BLOOD PRESSURE: 67 MMHG | SYSTOLIC BLOOD PRESSURE: 162 MMHG

## 2019-04-19 VITALS — SYSTOLIC BLOOD PRESSURE: 176 MMHG | DIASTOLIC BLOOD PRESSURE: 77 MMHG

## 2019-04-19 VITALS — SYSTOLIC BLOOD PRESSURE: 172 MMHG | DIASTOLIC BLOOD PRESSURE: 78 MMHG

## 2019-04-19 VITALS — DIASTOLIC BLOOD PRESSURE: 63 MMHG | SYSTOLIC BLOOD PRESSURE: 124 MMHG

## 2019-04-19 VITALS — SYSTOLIC BLOOD PRESSURE: 155 MMHG | DIASTOLIC BLOOD PRESSURE: 63 MMHG

## 2019-04-19 VITALS — DIASTOLIC BLOOD PRESSURE: 69 MMHG | SYSTOLIC BLOOD PRESSURE: 158 MMHG

## 2019-04-19 VITALS — SYSTOLIC BLOOD PRESSURE: 152 MMHG | DIASTOLIC BLOOD PRESSURE: 61 MMHG

## 2019-04-19 VITALS — SYSTOLIC BLOOD PRESSURE: 159 MMHG | DIASTOLIC BLOOD PRESSURE: 62 MMHG

## 2019-04-19 VITALS — SYSTOLIC BLOOD PRESSURE: 123 MMHG | DIASTOLIC BLOOD PRESSURE: 52 MMHG

## 2019-04-19 VITALS — SYSTOLIC BLOOD PRESSURE: 172 MMHG | DIASTOLIC BLOOD PRESSURE: 72 MMHG

## 2019-04-19 VITALS — DIASTOLIC BLOOD PRESSURE: 68 MMHG | SYSTOLIC BLOOD PRESSURE: 159 MMHG

## 2019-04-19 VITALS — SYSTOLIC BLOOD PRESSURE: 135 MMHG | DIASTOLIC BLOOD PRESSURE: 62 MMHG

## 2019-04-19 VITALS — DIASTOLIC BLOOD PRESSURE: 63 MMHG | SYSTOLIC BLOOD PRESSURE: 139 MMHG

## 2019-04-19 VITALS — SYSTOLIC BLOOD PRESSURE: 130 MMHG | DIASTOLIC BLOOD PRESSURE: 61 MMHG

## 2019-04-19 VITALS — SYSTOLIC BLOOD PRESSURE: 129 MMHG | DIASTOLIC BLOOD PRESSURE: 50 MMHG

## 2019-04-19 VITALS — SYSTOLIC BLOOD PRESSURE: 171 MMHG | DIASTOLIC BLOOD PRESSURE: 68 MMHG

## 2019-04-19 VITALS — DIASTOLIC BLOOD PRESSURE: 60 MMHG | SYSTOLIC BLOOD PRESSURE: 149 MMHG

## 2019-04-19 VITALS — DIASTOLIC BLOOD PRESSURE: 67 MMHG | SYSTOLIC BLOOD PRESSURE: 165 MMHG

## 2019-04-19 VITALS — DIASTOLIC BLOOD PRESSURE: 54 MMHG | SYSTOLIC BLOOD PRESSURE: 121 MMHG

## 2019-04-19 VITALS — SYSTOLIC BLOOD PRESSURE: 173 MMHG | DIASTOLIC BLOOD PRESSURE: 81 MMHG

## 2019-04-19 VITALS — DIASTOLIC BLOOD PRESSURE: 51 MMHG | SYSTOLIC BLOOD PRESSURE: 119 MMHG

## 2019-04-19 VITALS — DIASTOLIC BLOOD PRESSURE: 71 MMHG | SYSTOLIC BLOOD PRESSURE: 173 MMHG

## 2019-04-19 VITALS — SYSTOLIC BLOOD PRESSURE: 163 MMHG | DIASTOLIC BLOOD PRESSURE: 71 MMHG

## 2019-04-19 LAB — C DIFF TOX A+B STL QL: NEGATIVE

## 2019-04-19 NOTE — NUR
resting in bed with eyes closed; no apparent distress noted; pt offers no
complaints; iv intact and patent; cardene continues at 15 mg/hr; no redness or
edema noted at site; sr on monitor; will continue to monitor

## 2019-04-19 NOTE — NUR
awake in bed; no apparent distress noted; pt offers no complaints; iv patent;
fluids infusing without complication; no redness or edema noted at site; sr on
monitor; pt deny needs; portable phone provided; call light within reach

## 2019-04-19 NOTE — NUR
awake in bed; medications explained and given; pt refused both breakfast and
lunch; tolerating po fluids well; iv patent; ivf infusing without
complication; sr on monitor; bp remain stable; denies additional needs; call
light within reach; will continue to monitor

## 2019-04-19 NOTE — NUR
pt on call light requesting bedpan; pt instructed she will be assisted per
this writer to bsc; educated on need to increase activity/ build strength; pt
states "I think I have already went"; up to bsc; lg incontinence of liquid
brown stool noted to bed and now floor; partial bath given; back to bed;
cardene gtt weaned at this time; ns remains at kvo; Dr Alaniz present at
bedside; no orders received; will continue to monitor

## 2019-04-19 NOTE — NUR
Dr Zeledon called per writer; writer informed MD of pt's temp 101.3 with no
tylenol or antibiotics ordered; also informed of pt complaints of pain; orders
received and on chart

## 2019-04-19 NOTE — NUR
pt awake in bed; no apparent distress noted; assessment completed at this
time; pt alert and oriented; admits to gen pain; no n/v noted; resp even and
unlabored; lungs clear bilat; skin color wnl; ra; hr reg; strong pulses; no
edema noted; sr on monitor; abd soft with bs prsent; no bm noted per writer;
no diarrhea reported since admission; pt admits to voiding without pain or
burning; no urine to inspect at this time; bsc; #20 to rfa patent with ivf
infusing at kvo/ cardene gtt at 15mg/hr; no redness or edema noted at site;
plan of care/ am meds explained; call light within reach; will continue to
monitor

## 2019-04-19 NOTE — NUR
awake in bed; pt with complaints of being cold; pt request this writer to turn
broderick on, denied; pt explained s/s of fever and current temp of 101.0;
writer request to remove blankets (pt with 4 blankets on bed) and pt has
refused; medicated with tylenol; iv patent; fluids infusing without
complication; will continue to monitor

## 2019-04-19 NOTE — NUR
pt on call light; admits to incontinence; assist to bsc for linen change;
liquid stool expelling as pt being assisted to commode; pericare per writer;
back to bed; sr on monitor sbp 170; pt to medicated with hydralazine prn; pt
offers no complaints; call light within reach; will continue to monitor

## 2019-04-19 NOTE — NUR
awake in bed; additional blankets provided as per request; iv patent; fluids
infusing without complication; no redness or edema noted at site; sr on
monitor; afebrile; toileting offered/declined; will continue to monitor

## 2019-04-19 NOTE — NUR
Dr Zeledon called per writer in regards to pt being a diabetic with no
accucheck/coverage ordered; orders to be placed

## 2019-04-19 NOTE — NUR
awake. requests to be turned. instructed pt she was able to turn self. pt
requires much encouragement to assist with care as she is more than capable
but doesn't want to. cardiac monitor shows sinus rhythm. #20 rfa rl infusing @
75cchr. po fluids taken voids incont & per bsc. fall precautions cont.

## 2019-04-19 NOTE — NUR
requested bedpan. request denied. assisted x1 to bsc. marisa well. pt admits "i
want to turn." instructed pt to turn. pt turned self.

## 2019-04-19 NOTE — NUR
pt on call light requesting help to "turn over"; pt instructed to reposition
self and be more active for adls for self; pt able to reposition self to right
side without complication; will continue to monitor

## 2019-04-19 NOTE — NUR
Dr Alaniz informed again per writer of elevated temp, current 101+ with no
orders for antibiotics and questionable uti; tylenol administered as per
orders; orders to be placed

## 2019-04-20 VITALS — DIASTOLIC BLOOD PRESSURE: 72 MMHG | SYSTOLIC BLOOD PRESSURE: 145 MMHG

## 2019-04-20 VITALS — DIASTOLIC BLOOD PRESSURE: 67 MMHG | SYSTOLIC BLOOD PRESSURE: 137 MMHG

## 2019-04-20 VITALS — DIASTOLIC BLOOD PRESSURE: 67 MMHG | SYSTOLIC BLOOD PRESSURE: 140 MMHG

## 2019-04-20 VITALS — DIASTOLIC BLOOD PRESSURE: 71 MMHG | SYSTOLIC BLOOD PRESSURE: 140 MMHG

## 2019-04-20 VITALS — SYSTOLIC BLOOD PRESSURE: 156 MMHG | DIASTOLIC BLOOD PRESSURE: 69 MMHG

## 2019-04-20 VITALS — SYSTOLIC BLOOD PRESSURE: 147 MMHG | DIASTOLIC BLOOD PRESSURE: 56 MMHG

## 2019-04-20 VITALS — SYSTOLIC BLOOD PRESSURE: 118 MMHG | DIASTOLIC BLOOD PRESSURE: 62 MMHG

## 2019-04-20 VITALS — SYSTOLIC BLOOD PRESSURE: 141 MMHG | DIASTOLIC BLOOD PRESSURE: 82 MMHG

## 2019-04-20 VITALS — DIASTOLIC BLOOD PRESSURE: 65 MMHG | SYSTOLIC BLOOD PRESSURE: 146 MMHG

## 2019-04-20 VITALS — SYSTOLIC BLOOD PRESSURE: 134 MMHG | DIASTOLIC BLOOD PRESSURE: 64 MMHG

## 2019-04-20 VITALS — DIASTOLIC BLOOD PRESSURE: 53 MMHG | SYSTOLIC BLOOD PRESSURE: 109 MMHG

## 2019-04-20 VITALS — DIASTOLIC BLOOD PRESSURE: 67 MMHG | SYSTOLIC BLOOD PRESSURE: 143 MMHG

## 2019-04-20 VITALS — DIASTOLIC BLOOD PRESSURE: 59 MMHG | SYSTOLIC BLOOD PRESSURE: 121 MMHG

## 2019-04-20 VITALS — DIASTOLIC BLOOD PRESSURE: 56 MMHG | SYSTOLIC BLOOD PRESSURE: 126 MMHG

## 2019-04-20 LAB
ANION GAP SERPL CALCULATED.3IONS-SCNC: 15 MMOL/L
BASOPHILS NFR BLD AUTO: 0 % (ref 0–3)
BUN SERPL-MCNC: 32 MG/DL (ref 8–23)
BUN/CREAT SERPL: 10
CHLORIDE SERPL-SCNC: 113 MMOL/L (ref 95–108)
CO2 SERPL-SCNC: 13 MMOL/L (ref 22–30)
CREAT SERPL-MCNC: 3.2 MG/DL (ref 0.5–1)
EOSINOPHIL NFR BLD AUTO: 0 % (ref 0–8)
ERYTHROCYTE [DISTWIDTH] IN BLOOD BY AUTOMATED COUNT: 15.1 % (ref 11.5–15.5)
HCT VFR BLD AUTO: 23.4 % (ref 37–47)
HGB BLD-MCNC: 8.4 G/DL (ref 12–16)
IMM GRANULOCYTES NFR BLD: 0.4 % (ref 0–5)
LYMPHOCYTES NFR BLD: 10 % (ref 15–41)
MCH RBC QN AUTO: 28.8 PG  CALC (ref 26–32)
MCHC RBC AUTO-ENTMCNC: 35.9 G/L CALC (ref 32–36)
MCV RBC AUTO: 80.1 FL  CALC (ref 80–100)
MONOCYTES NFR BLD AUTO: 7 % (ref 2–13)
NEUTROPHILS # BLD AUTO: 8.8 THOU/UL (ref 2–7.15)
NEUTROPHILS NFR BLD AUTO: 82 % (ref 42–76)
PLATELET # BLD AUTO: 285 THOU/UL (ref 130–400)
POTASSIUM SERPL-SCNC: 3.9 MMOL/L (ref 3.5–5.1)
RBC # BLD AUTO: 2.92 MILL/UL (ref 4.2–5.6)
SODIUM SERPL-SCNC: 138 MMOL/L (ref 137–146)

## 2019-04-20 NOTE — NUR
PT ASSISTED OOB TO RECLINER, TOLERATED ACTIVITY WELL, OFFERS NO COMPLAINTS
TEMP IMPROVED SINCE TYLENOL, COMFROT MEASURES PROVIDED, WILL CONTINUE TO
MONITOR, CALL KAYE GARCIA

## 2019-04-20 NOTE — NUR
PT. RESTING IN BED WITH NO DISTRESS NOTED; RESP EVEN AND UNLABORED; O2
INFUSING PER NC @2LITERS/MIN DUE TO PT'S SPO2 BEING 88% ON RA; ASSESSMENT
COMPLETED; VOICES NO CONCERNS; CALL LIGHT IS IN REACH; WILL CONTINUE TO
MONITOR.

## 2019-04-20 NOTE — NUR
pt continent of small maount celar yellow urine, self radha cre provided and pt
backto bed with same assist, tolerated activity well, call bell within reach,
will continue to monitor.

## 2019-04-20 NOTE — NUR
PT OUT OR RECLINER TO BSC SOME INCONTINENCE OF STOOL, ELLA CARE PROVIDED AND
OB PANTIES AND INCONTINENCE PAD PLACED, THEN TRANSFERRED PT TO MED SURG VIA
WHEELCHAIR, ALL BELONGINGS SENT WITH PT. GERARD TERRY ON ROOM UPON ARRIVAL, GERARD
AND NURSE THOM AWARE OF NEED FOR TELEMETRY.

## 2019-04-20 NOTE — NUR
PT. RESTING IN BED REPORTS XANAX HELPED HER RELAX SOME; AGAIN RE-EDUCATED ON
DEEP BREATHING TECHNIQUES; VS OBTAINED; SPO2 90% ON O2 @2LITERS/MIN PER NC;
ENCOURAGED TO CALL FOR ANY NEEDS; CALL LIGHT IS IN REACH; CNA IN AT BEDSIDE
ASSISTING PT. TO BSC; WILL CONTINUE TO MONITOR.

## 2019-04-20 NOTE — NUR
pt oob with min assist to bsc, call bell within reach, instructed to call when
ready, verbalizes understanding

## 2019-04-20 NOTE — NUR
PT OOB TO RECLINER, INCONTINENT OF MODERATE LOOSE STOOL, ELLA CARE PROVIDED
AND SITTING IN RECLINER WITH FEET ELEVATED, WILL CONTINUE TO MONITOR

## 2019-04-20 NOTE — NUR
pt places call light on; pt awake in bed; no apparent distress noted; pt
asking about when she can go home; educated regrding d/c process and need for
D/C orders; AM assessment completed at this time; pt alert and oriented;
complains of generalized pain but non specific with details and locations;  no
n/v noted; resp even and unlabored; lungs clear bilat; hr reg; strong pulses;
no edema noted; SR on monitor, rate 90's;  abd soft with bs prsent; last bm
yesterday per report; denies difficulty urinating;  no urine to inspect at
this time; bsc; #20 to rfa patent with ivf infusing at prescribed rate
no redness or edema noted at site;  plan of care/ am meds explained; call
light within reach; will continue to monitor

## 2019-04-20 NOTE — NUR
PT BACK TO BED WITH MIN ASSIST, TOLERATES ACTIVITY WELL, CALL BELL WITHIN
REACH, WILL CONTINUE TO MONITOR.

## 2019-04-20 NOTE — NUR
PT RESTING IN BED, OFFERS NO COMPLAINTS, ASSISTED WITH AFTERNOON MEAL, ACCU
CHECK COMPLETED EARLIER AND COVERAGE GIVEN AS ORDERED, CALL BELL WITHIN REACH,
WILL CONTINUE TO MONITOR.

## 2019-04-20 NOTE — NUR
2155- PT. REPORTING SOB SPO2 91% ON O2 @2LITERS/MIN PER NC; LS CLEAR;PT. IS A
SHALLOW BREATHER AND ENCOURAGED TO DEEP BREATHE AS WELL AS INCENTIVE
SPIROMETER GIVEN AND PT. PULLING 500 AND IS INSTRUCTED TO USE 10X'S AN HOUR
WHILE AWAKE;PT. REPORTING ANXIETY AND REPORTS THAT SHE NEEDS XANAX ORDERED
TO RELAX HER.
 
2200- NOTIFIED DR. MIRANDA OF THE ABOVE NOTE; NEW ORDERS RECEIVED AND TO BE
CARRIED OUT.

## 2019-04-21 VITALS — SYSTOLIC BLOOD PRESSURE: 119 MMHG | DIASTOLIC BLOOD PRESSURE: 59 MMHG

## 2019-04-21 VITALS — DIASTOLIC BLOOD PRESSURE: 67 MMHG | SYSTOLIC BLOOD PRESSURE: 148 MMHG

## 2019-04-21 VITALS — DIASTOLIC BLOOD PRESSURE: 75 MMHG | SYSTOLIC BLOOD PRESSURE: 156 MMHG

## 2019-04-21 VITALS — SYSTOLIC BLOOD PRESSURE: 126 MMHG | DIASTOLIC BLOOD PRESSURE: 64 MMHG

## 2019-04-21 VITALS — SYSTOLIC BLOOD PRESSURE: 145 MMHG | DIASTOLIC BLOOD PRESSURE: 73 MMHG

## 2019-04-21 VITALS — DIASTOLIC BLOOD PRESSURE: 74 MMHG | SYSTOLIC BLOOD PRESSURE: 162 MMHG

## 2019-04-21 LAB
ANION GAP SERPL CALCULATED.3IONS-SCNC: 17 MMOL/L
BASOPHILS NFR BLD AUTO: 0 % (ref 0–3)
BUN SERPL-MCNC: 33 MG/DL (ref 8–23)
BUN/CREAT SERPL: 11
CHLORIDE SERPL-SCNC: 113 MMOL/L (ref 95–108)
CO2 SERPL-SCNC: 12 MMOL/L (ref 22–30)
CREAT SERPL-MCNC: 3 MG/DL (ref 0.5–1)
EOSINOPHIL NFR BLD AUTO: 0 % (ref 0–8)
ERYTHROCYTE [DISTWIDTH] IN BLOOD BY AUTOMATED COUNT: 15.1 % (ref 11.5–15.5)
HCT VFR BLD AUTO: 24 % (ref 37–47)
HGB BLD-MCNC: 8.8 G/DL (ref 12–16)
IMM GRANULOCYTES NFR BLD: 0.6 % (ref 0–5)
LYMPHOCYTES NFR BLD: 7 % (ref 15–41)
MCH RBC QN AUTO: 29.1 PG  CALC (ref 26–32)
MCHC RBC AUTO-ENTMCNC: 36.7 G/L CALC (ref 32–36)
MCV RBC AUTO: 79.5 FL  CALC (ref 80–100)
MONOCYTES NFR BLD AUTO: 5 % (ref 2–13)
NEUTROPHILS # BLD AUTO: 9.52 THOU/UL (ref 2–7.15)
NEUTROPHILS NFR BLD AUTO: 87 % (ref 42–76)
PLATELET # BLD AUTO: 326 THOU/UL (ref 130–400)
POTASSIUM SERPL-SCNC: 3.7 MMOL/L (ref 3.5–5.1)
RBC # BLD AUTO: 3.02 MILL/UL (ref 4.2–5.6)
SODIUM SERPL-SCNC: 138 MMOL/L (ref 137–146)

## 2019-04-21 NOTE — NUR
PT RESTING IN BED. REPOSITIONED FOR COMFORT DUE TO BACK PAIN. PT DENIES ANY
FURTHER NEEDS. CALL LIGHT IN REACH. WILL CONTINUE TO MONITOR.

## 2019-04-21 NOTE — NUR
ASSESSMEMT COMPLETED; NO DISTRESS NOTED; RESP EVEN SHALLOW; ENCOURAGED DEEP
BREATHING AND USE OF INCENTIVE SPIROMETER; DENIES NEEDS; O2 INFUSING PER NC A
PER ORDER; DENIES NEEDS; CALL LIGHT IS IN REACH.

## 2019-04-21 NOTE — NUR
PT A/O X3. SPEECH IS CLEAR. RESP EVEN AND UNLABORED. LUNG SOUNDS CLEAR. O2 @3L
ON PT FOR SOB AND DECREASED O2 STATUS; PT NONCOMPLIANT, REMOVES O2 SHORTLY
AFTER PUTTING IT ON. TELE IN PLACE. BOWEL SOUNDS ACTIVE X4. STRONG RADIAL AND
PEDAL PULSES. #20 RAC LR @75. SITE APPEARS HEALTHY. SKIN INTACT. PT DENIES ANY
PAIN OR NEEDS. POC DISCUSSED. INCENTIVE SPIROMETER AT BEDSIDE; ENCOURAGED USE.
CALL LIGHT IN REACH. WILL CONTINUE TO MONITOR.

## 2019-04-21 NOTE — NUR
PT. ANXIOUS; MEDICATED WITH ORDERED XANAX AS PER ORDER; ORANGE JUICE PROVIDED;
CALL LIGHT IS IN REACH.

## 2019-04-21 NOTE — NUR
PT C/O BACK PAIN 9 OUT OF 10 ON PAIN SCALE. MEDICATED W/ 650 MG TYLENOL PO.
REPOSITIONED FOR COMFORT. PT DENIES ANY FURTHER NEEDS. IV INFUSING. TELE IN
PLACE. CALL LIGHT IN REACH. WILL CONTINUE TO MONITOR.

## 2019-04-21 NOTE — NUR
PT. IS MEDICATED WITH ORDERED TYLENOL FOR TEMP .2; WILL REASSESS; PT.
DENIES FURTHER NEEDS; CALL LIGHT IS IN REACH. MD ON CALL WAS NOTIFIED OF CXRAY
RESULTS FROM [facility] AS WELL AS CURRENT ABT'S;

## 2019-04-21 NOTE — NUR
PT REPORT RECIEVED FROM GEOVANNY VALLEJO. PT RESTING. NO S/S OF DISTRESS. CALL LIGHT
IN REACH. WILL CONTINUE TO MONITOR.

## 2019-04-22 VITALS — SYSTOLIC BLOOD PRESSURE: 168 MMHG | DIASTOLIC BLOOD PRESSURE: 64 MMHG

## 2019-04-22 VITALS — SYSTOLIC BLOOD PRESSURE: 170 MMHG | DIASTOLIC BLOOD PRESSURE: 69 MMHG

## 2019-04-22 VITALS — DIASTOLIC BLOOD PRESSURE: 71 MMHG | SYSTOLIC BLOOD PRESSURE: 153 MMHG

## 2019-04-22 VITALS — DIASTOLIC BLOOD PRESSURE: 68 MMHG | SYSTOLIC BLOOD PRESSURE: 145 MMHG

## 2019-04-22 VITALS — DIASTOLIC BLOOD PRESSURE: 58 MMHG | SYSTOLIC BLOOD PRESSURE: 159 MMHG

## 2019-04-22 VITALS — DIASTOLIC BLOOD PRESSURE: 72 MMHG | SYSTOLIC BLOOD PRESSURE: 157 MMHG

## 2019-04-22 VITALS — SYSTOLIC BLOOD PRESSURE: 170 MMHG | DIASTOLIC BLOOD PRESSURE: 89 MMHG

## 2019-04-22 VITALS — SYSTOLIC BLOOD PRESSURE: 159 MMHG | DIASTOLIC BLOOD PRESSURE: 74 MMHG

## 2019-04-22 VITALS — DIASTOLIC BLOOD PRESSURE: 71 MMHG | SYSTOLIC BLOOD PRESSURE: 147 MMHG

## 2019-04-22 LAB
ANION GAP SERPL CALCULATED.3IONS-SCNC: 18 MMOL/L
BASOPHILS NFR BLD AUTO: 0 % (ref 0–3)
BUN SERPL-MCNC: 35 MG/DL (ref 8–23)
BUN/CREAT SERPL: 12
CHLORIDE SERPL-SCNC: 113 MMOL/L (ref 95–108)
CO2 SERPL-SCNC: 12 MMOL/L (ref 22–30)
CREAT SERPL-MCNC: 3 MG/DL (ref 0.5–1)
EOSINOPHIL NFR BLD AUTO: 0 % (ref 0–8)
ERYTHROCYTE [DISTWIDTH] IN BLOOD BY AUTOMATED COUNT: 14.9 % (ref 11.5–15.5)
HCT VFR BLD AUTO: 23.1 % (ref 37–47)
HGB BLD-MCNC: 8.3 G/DL (ref 12–16)
IMM GRANULOCYTES NFR BLD: 1.8 % (ref 0–5)
LYMPHOCYTES NFR BLD: 9 % (ref 15–41)
MAGNESIUM SERPL-MCNC: 1.6 MG/DL (ref 1.6–2.3)
MCH RBC QN AUTO: 28.5 PG  CALC (ref 26–32)
MCHC RBC AUTO-ENTMCNC: 35.9 G/L CALC (ref 32–36)
MCV RBC AUTO: 79.4 FL  CALC (ref 80–100)
MONOCYTES NFR BLD AUTO: 1 % (ref 2–13)
NEUTROPHILS # BLD AUTO: 5.52 THOU/UL (ref 2–7.15)
NEUTROPHILS NFR BLD AUTO: 88 % (ref 42–76)
PLATELET # BLD AUTO: 329 THOU/UL (ref 130–400)
POTASSIUM SERPL-SCNC: 3.8 MMOL/L (ref 3.5–5.1)
RBC # BLD AUTO: 2.91 MILL/UL (ref 4.2–5.6)
SODIUM SERPL-SCNC: 140 MMOL/L (ref 137–146)

## 2019-04-22 NOTE — NUR
REPORT RECEIVED FROM GEOVANNY VALLEJO;PT APPEARS TO BE SLEEPING IN SUPINE
POSITION;RESPIRATIONS SHALLOW ON O2 @ 3L VIA NC;NO S/S OF DISTRESS NOTED;TELE
MONITORING IN PLACE;FALL PRECAUTIONS NOTED WITH BED IN THE LOWEST POSITION AND
CALL LIGHT IN REACH;WILL CONTINUE TO MONITOR

## 2019-04-22 NOTE — NUR
[id number]-
NEW IV STARTED DUE TO DATE EXPIRING ON OTHER SITE; NEW #22 GAUGE STARTED TO RH
X1 ATTEMPT; IV SITE REMOVED TO RFA; CATHETER TIP INTACT; PT. CLEANED OF A
SMALL INCONTINENCE OF URINE; NEW PAD PLACED UNDERNEATH; PT. IS OFFERED TO
ATTEMPT TO VOID IN BSC AND DECLINES AT THIS TIME; MEDICATED WITH ORDERED PRN
TYLENOL AND XANAX AS PER ORDER AND PT'S REQUEST; DENIES FURTHER NEEDS; CALL
LIGHT IS IN REACH.

## 2019-04-22 NOTE — NUR
PT. RESTING IN BED ON RIGHT SIDE WITH EYES CLOSED; NO DISTRESS NOTED; AWAKENED
FOR VS; VSS; NO DISTRESS NOTED;DENIES NEEDS/PAIN; PO FLUIDS ENCOURAGED; CALL
LIGHT IS IN REACH; WILL CONTINUE TO MONITOR.

## 2019-04-22 NOTE — NUR
CNA IN AT BEDSIDE CHANGING PT. OF INCONTINENCE OF URINE; PT. IS REFUSING TO
GET OOB TO VOID AND REPORTS SHE JUST DOESNT HAVE THE STRENGTH TO GET OOB; PT.
IS ENCOURAGED TO GET OOB AND MOVE; WILL NEED REINFORCEMENT; CALL LIGHT IS IN
REACH; WILL CONTINUE TO MONITOR.

## 2019-04-22 NOTE — NUR
PT. RESTING IN BED ON LEFT SIDE WITH EYES OPEN; FAMILY IS AT BEDSIDE; NO
DISTRESS NOTED; ASSESSMENT COMPLETED; B/P SLIGHTLY ELEVATED; WILL AMDINISTER
PO PM B/P MEDS SHORTLY; PT. DENIES NEEDS/PAIN; ENCOURAGED TO CALL FOR ANY
NEEDS AND USE INCENTIVE SPIROMETER; VERBALIZES UNDERSTANDING.

## 2019-04-22 NOTE — NUR
PT CURRENT /89 HR 99, PT MEDICATED WITH PRN APRESOLINE 10MG IVP BY
GEOVANNY LOMBARDI;WILL MONITOR FOR EFFECT.

## 2019-04-22 NOTE — NUR
PT RESTING IN SEMI FOWLERS POSITION WITH VISITOR AT BEDSIDE;PT DENIES ANY
CURRENT PAIN OR NEEDS;IV FLUIDS INFUSING TO KVO PER ORDER;TELE MONITORING IN
PLACE;RESPIRATIONS EVEN AND UNLABORED ON O2 @ 3L VIA NC;ACCUCHECK 137, NO
COVERAGE NEEDED;ENCOURAGED TO INCREASE PO INTAKE;ASSESSMENT REMAINS UNCHANGED
AT THIS TIME;FALL PRECAUTIONS IN PLACE WITH CALL LIGHT IN REACH;WILL CONTINUE
TO MONITOR

## 2019-04-22 NOTE — NUR
PT. REQUESTS ALL PM MEDS AT THIS TIME AS WELL AS PRN XANAX AND TYLENOL AS SHE
IS READY FOR BED AT THIS TIME; MEDICATED AT THIS TIME; PT. IS INCONTINENT OF
LARGE INCONTINENCE OF URINE; PT. AGAIN IS ENCOURAGED TO GET OOB TO VOID AS SHE
KNOWS WHEN SHE NEEDS TO; VERBALIZES UNDERSTANDING; PT. REPORTS SHE DOES NOT
HAVE TO GO ANYMORE NOW; PT. PROVIDED HERSELF WITH ELLA CARE; THIS WRITER
CHANGED TOP LINENS, GOWN, AND UNDERNEATH PADS; PT. ABLE TO REPOSITION SELF IN
BED AND IS ENCOURAGED TO DO SO AS WELL AS TO USE INCENTIVE SPIROMETER; SPO2
95% ON RA; PT. DID DRINK ALL OF HER GRAPE JUICE AND ORDERED INSULIN PROVIDED
FOR BS; INSTRUCTED TO CALL FOR ANY NEEDS; CALL LIGHT IS IN REACH; WILL
CONTINUE TO MONITOR.

## 2019-04-22 NOTE — NUR
PT RESTING IN SUPINE POSITION;RESPIRATIONS EVEN AND UNLABORED ON RA;PT DENIES
ANY CURRENT PAIN OR NEEDS;IV FLUIDS CONTINUE TO INFUSE TO RIGHT HAND;TELE
MONITORING IN PLACE;ENCOURAGED PT TO EXPRESS ANY NEEDS OR CONCERNS;FALL
PRECAUTIONS IN PLACE WITH CALL LIGHT IN REACH;WILL CONTINUE TO MONITOR

## 2019-04-22 NOTE — NUR
PT RESTING IN SUPINE POSITION, A&O X3;VS OBTAINED AND ASSESSMENT COMPLETED;PT
DENIES ANY CURRENT PAIN,PAIN SCALE AND REPORTING EDUCATED;RESPIRATIONS SHALLOW
ON O2 @ 3L VIA NC;I.S. AT BEDSIDE AND PT DEMONSTARTED USE AND ENCOURAGED USE
10X PER HOUR;ABDOMEN DISTENDED/SOFT ON PALPATION AND ACTIVE IN ALL 4
QUADRANTS;STRONG PEDAL PULSES;SKIN INTACT;#22G TO RIGHT HAND INFUSING LR @ KVO
WITH EASE;TELE MONITORING IN PLACE;PT DENIES ANY ADDITIONAL NEEDS AT THIS TIME
AND IS ENCOURAGED TO CALL FOR ASSISTANCE IF NEEDED;CALL LIGHT IN REACH;WILL
CONTINUE TO MONITOR

## 2019-04-22 NOTE — NUR
PT. REPORTING LEFT RIB PAIN AND BACK PAIN; MEDICATED WITH ORDERED TYLENOL;
WILL REASSESS; DENIES FURTHER NEEDS; CALL LIGHT IS IN REACH.

## 2019-04-22 NOTE — NUR
PT. UP TO BSC TO VOID AT THIS TIME WITH STBY ASSISTANCE AND PT. BACK INTO BED;
DENIES FURTHER NEEDS; CALL LIGHT IS IN REACH.

## 2019-04-23 VITALS — DIASTOLIC BLOOD PRESSURE: 80 MMHG | SYSTOLIC BLOOD PRESSURE: 188 MMHG

## 2019-04-23 VITALS — DIASTOLIC BLOOD PRESSURE: 75 MMHG | SYSTOLIC BLOOD PRESSURE: 159 MMHG

## 2019-04-23 VITALS — DIASTOLIC BLOOD PRESSURE: 76 MMHG | SYSTOLIC BLOOD PRESSURE: 157 MMHG

## 2019-04-23 VITALS — DIASTOLIC BLOOD PRESSURE: 72 MMHG | SYSTOLIC BLOOD PRESSURE: 175 MMHG

## 2019-04-23 LAB
ALBUMIN SERPL-MCNC: 3.6 G/DL (ref 3.2–5)
ALP SERPL-CCNC: 88 U/L (ref 38–126)
ANION GAP SERPL CALCULATED.3IONS-SCNC: 18 MMOL/L
AST SERPL-CCNC: 12 U/L (ref 9–36)
BASOPHILS NFR BLD AUTO: 0 % (ref 0–3)
BUN SERPL-MCNC: 42 MG/DL (ref 8–23)
BUN/CREAT SERPL: 14
CHLORIDE SERPL-SCNC: 111 MMOL/L (ref 95–108)
CO2 SERPL-SCNC: 14 MMOL/L (ref 22–30)
CREAT SERPL-MCNC: 2.9 MG/DL (ref 0.5–1)
EOSINOPHIL NFR BLD AUTO: 0 % (ref 0–8)
ERYTHROCYTE [DISTWIDTH] IN BLOOD BY AUTOMATED COUNT: 15 % (ref 11.5–15.5)
HCT VFR BLD AUTO: 23.7 % (ref 37–47)
HGB BLD-MCNC: 8.5 G/DL (ref 12–16)
IMM GRANULOCYTES NFR BLD: 3.3 % (ref 0–5)
LYMPHOCYTES NFR BLD: 12 % (ref 15–41)
MAGNESIUM SERPL-MCNC: 1.7 MG/DL (ref 1.6–2.3)
MCH RBC QN AUTO: 28.4 PG  CALC (ref 26–32)
MCHC RBC AUTO-ENTMCNC: 35.9 G/L CALC (ref 32–36)
MCV RBC AUTO: 79.3 FL  CALC (ref 80–100)
MONOCYTES NFR BLD AUTO: 4 % (ref 2–13)
NEUTROPHILS # BLD AUTO: 7.37 THOU/UL (ref 2–7.15)
NEUTROPHILS NFR BLD AUTO: 81 % (ref 42–76)
PLATELET # BLD AUTO: 391 THOU/UL (ref 130–400)
POTASSIUM SERPL-SCNC: 4.6 MMOL/L (ref 3.5–5.1)
PROT SERPL-MCNC: 7.4 G/DL (ref 6.3–8.2)
RBC # BLD AUTO: 2.99 MILL/UL (ref 4.2–5.6)
SODIUM SERPL-SCNC: 138 MMOL/L (ref 137–146)

## 2019-04-23 NOTE — NUR
EKG PERFORMED BY RT CHRIST;EKG RESULTED IN SINUS RHYTHM WITH PREMATURE ATRIAL
COMPLEXS WITH A RATE OF 98. NOTIFIED.

## 2019-04-23 NOTE — NUR
REPORT RECEIVED FROM GEOVANNY VALLEJO;PT APPEARS TO BE SLEEPING IN SUPINE POSITION;NO
S/S OF DISTRESS NOTED;RESPIRATIONS EVEN AND UNLABORED ON O2 @ 2L VIA NC;TELE
MONITORING IN PLACE;FALL PRECAUTIONS NOTED WITH BED IN THE LOWEST POSITION AND
CALL LIGHT IN REACH;WILL CONTINUE TO MONITOR

## 2019-04-23 NOTE — NUR
PT REPORTS THAT SHE WANTS TO LEAVE AMA, EDUCATED ON RISKS OF LEAVING AGAINST
MEDICAL ADVICE AND PT VERBALIZES UNDERSTANDING, MD TO BE NOTIFIED.

## 2019-04-23 NOTE — NUR
PT. SLEEPING AND IS AWAKENED FOR AM VS; VSS; NO DISTRESS NOTED; O2 TITRATED
DOWN TO 2LITERS/MIN PER NC; PT. IS ASSISTED UP TO BSC AND VOIDED 300MLS OF
URINE AND HAD A SMALL BM. PT. IS ASSISTED WITH ELLA CARE AND BACK INTO BED; PO
FLUIDS OFFERED; DENIES NEEDS; CALL LIGHT IS IN REACH.

## 2019-04-23 NOTE — NUR
PT. RESTING IN BED WITH EYES CLOSED; RESP EVEN AND UNLABORED; CALL LIGHT IS IN
REACH; WILL CONTINUE TO MONITOR.

## 2019-04-23 NOTE — NUR
PT OOB RESTING IN RECLINER TALKING WITH J CARLOS;RESPIRATIONS EVEN
AND UNLABORED ON O2 @ 2L VIA NC;PT DENIES ANY CURRENT PAIN OR DISCOMFORTS;TELE
MONITORING IN PLACE;ACCUCHECK , PT WAS COVERED WITH SLIDING SCALE
INSULIN PER ORDER;PT EXPRESSES WISHES TO GO HOME STATING "IF THE DOCTOR LETS
ME GO OR NOT IM GOING HOME",PT RE-ASSURED THAT MD WOULD BE MAKING ROUNDS
SHORTLY AND THAT ALL OF HER QUESTIONS WOULD BE ADDRESSED;ASSESSMENT REMAINS
UNCHANGED AT THIS TIME;ENCOURAGED TO CALL FOR ASSISTANCE IF NEEDED;FALL
PRECAUTIONS IN PLACE WITH CALL LIGHT IN REACH;WILL CONTINUE TO MONITOR

## 2019-04-23 NOTE — NUR
PT RESTING IN SEMI FOWLERS POSITION, A&O X3;VS OBTAINED AND ASSESSMENT
COMPLETED;PT DENIES ANY CURRENT PAIN OR DISCOMFORTS,PAIN SCALE AND REPORTING
EDUCATED;RESPIRATIONS EVEN AND UNLABORED ON O2 @ 2L VIA NC;ABDOMEN
DISTENDED/SOFT ON PALPATION AND ACTIVE IN ALL 4 QUADRANTS;STRONG PEDAL
PULSES;SKIN INTACT;#22G TO RIGHT HAND INFUSING LR @ KVO PER ORDER,SITE APPEARS
HEALTHY;TELE MONITORING IN PLACE;ACCUCHECK 204, PT COVERED WITH NOVOLOG
SLIDING SCALE PER ORDER;PT DENIES ANY ADDITIONAL NEEDS AND IS ENCOURAGED TO
CALL FOR ASSISTANCE IF NEEDED;FALL PRECAUTIONS IN PLACE WITH CALL LIGHT IN
REACH;WILL CONTINUE TO MONITOR

## 2019-08-14 NOTE — NUR
OFFICE VISIT      Patient: Pradeep Stuart Date of Service: 2019   : 1939 MRN: [id number]         SUBJECTIVE:     Chief Complaint   Patient presents with   • Physical     360 visit       Pradeep Stuart is a 80 year old male who presents today for 360/HBP/lipids/BPH    HISTORY OF PRESENT ILLNESS:   HPI    Feels well  Active ADLs  Attends gym  4x per week  Rides bike one hour  4 xper week  No exertional sx    360 evaluation    Activities of daily living  Independent for all ADLs except for those noted below  Patient needs no assistance with the following  except if otherwise noted below:    Mobility/transfers  Toileting  Hygiene/bathing  Dressing  Cooking  eating  Able to use a telephone  Shopping  Light housework  Driving/transportation  Finances    I have discussed with the patient how to reduce likelihood of falls in the home by removing throw rugs, loose wires or other objects from the floor, installing handrails and leaving lights on at night.  Also recommend safety in the bath or shower with hand rails and bath seat if needed.    Hearing screen  ok  Vision screen  ok    No significant alcohol use  No tobacco use  No drug use      Pain management/pain medications  None    Patient needs no added assistance in the home.  Patient needs no added assistance with meds.    No need for case management referral  No need for behavioral therapy referral    No multiple ER visits    Depression screen negative    Power of  for healthcare: wife    Discussed with patient resuscitation/CODE STATUS: he is full code  reviewed          PAST MEDICAL HISTORY:  History reviewed. No pertinent past medical history.    MEDICATIONS:  Current Outpatient Medications   Medication Sig   • amLODIPine (NORVASC) 5 MG tablet Take 1 tablet by mouth daily.   • finasteride (PROSCAR) 5 MG tablet Take 1 tablet by mouth daily.   • metoPROLOL succinate (TOPROL-XL) 50 MG 24 hr tablet Take 2 tablets by mouth daily. TAKE 2 TABLETS DAILY   •  PT REFUSES TO GET OUT OF BED, IV SITE IS FREE FROM REDNESS OR EDEMA.
IN TO VISIT PT AND WANTS TO SEND TO [facility] ER. tamsulosin (FLOMAX) 0.4 MG Cap Take 1 capsule by mouth daily after a meal. TAKE 1 CAPSULE DAILY   • atorvastatin (LIPITOR) 20 MG tablet Take 1 tablet by mouth daily. TAKE 1 TABLET BY MOUTH DAILY (REPLACES SIMVASTATIN)     No current facility-administered medications for this visit.        ALLERGIES:  ALLERGIES:  No Known Allergies    PAST SURGICAL HISTORY:  Past Surgical History:   Procedure Laterality Date   • Aortic valve replacement     • Coronary artery bypass graft         FAMILY HISTORY:  Family History   Problem Relation Age of Onset   • Patient is unaware of any medical problems Mother    • Patient is unaware of any medical problems Father        SOCIAL HISTORY:  Social History     Tobacco Use   • Smoking status: Never Smoker   • Smokeless tobacco: Never Used   Substance Use Topics   • Alcohol use: No     Frequency: Never   • Drug use: No       Review of Systems   Constitutional: Negative for activity change, appetite change, chills, fatigue, fever and unexpected weight change.   HENT: Negative for dental problem, hearing loss, mouth sores, sinus pain, sore throat, trouble swallowing and voice change.    Eyes: Negative for visual disturbance.   Respiratory: Negative for cough, chest tightness and shortness of breath.    Cardiovascular: Negative for chest pain and palpitations.   Gastrointestinal: Negative for abdominal pain, constipation, diarrhea, nausea and vomiting.   Genitourinary: Negative for dysuria and frequency.        Nocturia x 4   Musculoskeletal: Positive for arthralgias. Negative for back pain, gait problem and neck pain.        Occ right knee stiffness sree after  Rest  And non exertional on off left shoulder discomfort   Skin: Negative for rash.   Neurological: Negative for weakness, light-headedness, numbness and headaches.   Psychiatric/Behavioral: Negative for agitation, decreased concentration, dysphoric mood and sleep disturbance. The patient is not nervous/anxious.        OBJECTIVE:      Visit Vitals  /69   Pulse 69   Temp 96.6 °F (35.9 °C)   Ht 5' 8\" (1.727 m)   Wt 94.3 kg (208 lb)   BMI 31.63 kg/m²       Physical Exam   Constitutional: He is oriented to person, place, and time. He appears well-developed and well-nourished. No distress.   HENT:   Head: Normocephalic and atraumatic.   Right Ear: External ear normal.   Left Ear: External ear normal.   Nose: Nose normal.   Mouth/Throat: Oropharynx is clear and moist. No oropharyngeal exudate.   Eyes: Pupils are equal, round, and reactive to light. Conjunctivae and EOM are normal. No scleral icterus.   Neck: Normal range of motion. Neck supple.   Cardiovascular: Normal rate, regular rhythm, normal heart sounds and intact distal pulses.   No murmur heard.  Pulmonary/Chest: Effort normal and breath sounds normal. He exhibits no tenderness.   Abdominal: Soft. Bowel sounds are normal. He exhibits no distension and no mass. There is no tenderness. Musculoskeletal: Normal range of motion.         General: No deformity or edema.     Lymphadenopathy:     He has no cervical adenopathy.   Neurological: He is alert and oriented to person, place, and time. He has normal reflexes. No cranial nerve deficit. He exhibits normal muscle tone. Coordination normal.   monofillament  6/6  Right foot   Skin: Skin is warm and dry. He is not diaphoretic.   Psychiatric: He has a normal mood and affect. His behavior is normal. Judgment and thought content normal.       DIAGNOSTIC STUDIES:   LAB RESULTS:    Anti-Coag on 08/08/2019   Component Date Value Ref Range Status   • INR 08/08/2019 2.7   Final   Anti-Coag on 07/18/2019   Component Date Value Ref Range Status   • INR 07/18/2019 2.8   Final   Anti-Coag on 06/12/2019   Component Date Value Ref Range Status   • INR 06/12/2019 2.4   Final   Anti-Coag on 04/03/2019   Component Date Value Ref Range Status   • INR 04/03/2019 2.4   Final   Anti-Coag on 03/06/2019   Component Date Value Ref Range Status   • INR 03/06/2019  2.4   Final   Anti-Coag on 02/06/2019   Component Date Value Ref Range Status   • INR 02/06/2019 2.5   Final   Anti-Coag on 01/16/2019   Component Date Value Ref Range Status   • INR 01/16/2019 2.0   Final   Lab Services on 01/16/2019   Component Date Value Ref Range Status   • PROTIME 01/16/2019 20.7* 9.7 - 11.8 sec Final   • INR 01/16/2019 2.1   Final   • FASTING STATUS 01/16/2019 12  hrs Final   • CHOLESTEROL 01/16/2019 160  <200 mg/dL Final   • CALCULATED LDL 01/16/2019 75  <130 mg/dL Final   • HDL 01/16/2019 56  >39 mg/dL Final   • TRIGLYCERIDE 01/16/2019 146  <150 mg/dL Final   • CALCULATED NON HDL 01/16/2019 104  mg/dL Final   • CHOL/HDL 01/16/2019 2.9  <4.5 Final   • WBC 01/16/2019 8.2  4.2 - 11.0 K/mcL Final   • RBC 01/16/2019 5.21  4.50 - 5.90 mil/mcL Final   • HGB 01/16/2019 16.0  13.0 - 17.0 g/dL Final   • HCT 01/16/2019 48.6  39.0 - 51.0 % Final   • MCV 01/16/2019 93.3  78.0 - 100.0 fl Final   • MCH 01/16/2019 30.7  26.0 - 34.0 pg Final   • MCHC 01/16/2019 32.9  32.0 - 36.5 g/dL Final   • RDW-CV 01/16/2019 13.1  11.0 - 15.0 % Final   • PLT 01/16/2019 205  140 - 450 K/mcL Final   • NRBC 01/16/2019 0  0 /100 WBC Final   • Fasting Status 01/16/2019 12  hrs Final   • Sodium 01/16/2019 141  135 - 145 mmol/L Final   • Potassium 01/16/2019 4.1  3.4 - 5.1 mmol/L Final   • Chloride 01/16/2019 104  98 - 107 mmol/L Final   • Carbon Dioxide 01/16/2019 30  21 - 32 mmol/L Final   • Anion Gap 01/16/2019 11  10 - 20 mmol/L Final   • Glucose 01/16/2019 128* 65 - 99 mg/dL Final   • BUN 01/16/2019 18  6 - 20 mg/dL Final   • Creatinine 01/16/2019 0.87  0.67 - 1.17 mg/dL Final   • GFR Estimate,  01/16/2019 >90   Final   • GFR Estimate, Non  01/16/2019 83   Final   • BUN/Creatinine Ratio 01/16/2019 21  7 - 25 Final   • CALCIUM 01/16/2019 9.3  8.4 - 10.2 mg/dL Final   • TOTAL BILIRUBIN 01/16/2019 0.9  0.2 - 1.0 mg/dL Final   • AST/SGOT 01/16/2019 21  <38 Units/L Final   • ALT/SGPT 01/16/2019 34   <79 Units/L Final   • ALK PHOSPHATASE 01/16/2019 90  45 - 117 Units/L Final   • TOTAL PROTEIN 01/16/2019 7.8  6.4 - 8.2 g/dL Final   • Albumin 01/16/2019 4.0  3.6 - 5.1 g/dL Final   • GLOBULIN 01/16/2019 3.8  2.0 - 4.0 g/dL Final   • A/G Ratio, Serum 01/16/2019 1.1  1.0 - 2.4 Final   • Hemoglobin A1C 01/16/2019 6.4* 4.5 - 5.6 % Final   • MICROALBUMIN, UA (TTL) 01/16/2019 5.97  mg/dL Final   • CREATININE, URINE (TOTAL) 01/16/2019 173.00  mg/dL Final   • MICROALBUMIN/CREATININE 01/16/2019 34.5* <30 mg/g Final   Anti-Coag on 01/04/2019   Component Date Value Ref Range Status   • INR 01/04/2019 2.5   Final   Lab Services on 10/31/2018   Component Date Value Ref Range Status   • INR, FINGERSTICK 10/31/2018 2.2    Final   There may be more visits with results that are not included.       Assessment AND PLAN:   This is a 80 year old year-old male who presents with:    Problem List Items Addressed This Visit        Circulatory    Benign hypertensive heart disease without congestive heart failure     Stable post CABG 1997         Relevant Medications    amLODIPine (NORVASC) 5 MG tablet    Other Relevant Orders    COMPREHENSIVE METABOLIC PANEL    CBC NO DIFFERENTIAL    LIPID PANEL WITHOUT REFLEX    Coronary arteriosclerosis     Coronary artery disease is unchanged.  Continue current treatment regimen.  Dietary sodium restriction.  Continue current medications.  Cardiac status will be reassessed in 3 months.         Relevant Medications    amLODIPine (NORVASC) 5 MG tablet       Urinary    BPH associated with nocturia       Endocrine    Type 2 diabetes mellitus without complications (CMS/[facility]) - Primary     Diabetes is unchanged.   Continue current treatment regimen.  Reminded to bring in blood sugar diary at next visit.  Dietary recommendations for ADA diet.  Discussed foot care.  Diabetes will be reassessed in 3 months.         Relevant Orders    MICROALBUMIN URINE RANDOM    GLYCOHEMOGLOBIN    SERVICE TO OPHTHALMOLOGY           Please take medications as directed.      Instructions provided as documented in the AVS.    Return in about 4 months (around 12/14/2019).      The patient indicated understanding of the diagnosis and agreed with the plan of care.

## 2019-11-29 ENCOUNTER — HOSPITAL ENCOUNTER (EMERGENCY)
Dept: HOSPITAL 82 - ED | Age: 64
Discharge: NURSING, REHAB OR LONG-TERM CARE | End: 2019-11-29
Payer: COMMERCIAL

## 2019-11-29 VITALS — SYSTOLIC BLOOD PRESSURE: 174 MMHG | DIASTOLIC BLOOD PRESSURE: 91 MMHG

## 2019-11-29 VITALS — WEIGHT: 136.69 LBS | BODY MASS INDEX: 22.77 KG/M2 | HEIGHT: 65 IN

## 2019-11-29 DIAGNOSIS — E11.22: ICD-10-CM

## 2019-11-29 DIAGNOSIS — I12.0: Primary | ICD-10-CM

## 2019-11-29 DIAGNOSIS — J81.1: ICD-10-CM

## 2019-11-29 DIAGNOSIS — N18.6: ICD-10-CM

## 2019-11-29 LAB
ALBUMIN SERPL-MCNC: 4.4 G/DL (ref 3.2–5)
ALP SERPL-CCNC: 129 U/L (ref 38–126)
ANION GAP SERPL CALCULATED.3IONS-SCNC: 18 MMOL/L
AST SERPL-CCNC: 49 U/L (ref 9–36)
BASOPHILS NFR BLD AUTO: 0 % (ref 0–3)
BUN SERPL-MCNC: 74 MG/DL (ref 8–23)
BUN/CREAT SERPL: 18
CHLORIDE SERPL-SCNC: 113 MMOL/L (ref 95–108)
CO2 SERPL-SCNC: 14 MMOL/L (ref 22–30)
CREAT SERPL-MCNC: 4.2 MG/DL (ref 0.5–1)
EOSINOPHIL NFR BLD AUTO: 1 % (ref 0–8)
ERYTHROCYTE [DISTWIDTH] IN BLOOD BY AUTOMATED COUNT: 14.9 % (ref 11.5–15.5)
HCT VFR BLD AUTO: 26.6 % (ref 37–47)
HGB BLD-MCNC: 9.4 G/DL (ref 12–16)
IMM GRANULOCYTES NFR BLD: 0.5 % (ref 0–5)
INR PPP: 1 RATIO (ref 0.7–1.3)
LYMPHOCYTES NFR BLD: 19 % (ref 15–41)
MCH RBC QN AUTO: 28.7 PG  CALC (ref 26–32)
MCHC RBC AUTO-ENTMCNC: 35.3 G/L CALC (ref 32–36)
MCV RBC AUTO: 81.3 FL  CALC (ref 80–100)
MONOCYTES NFR BLD AUTO: 6 % (ref 2–13)
MYOGLOBIN SERPL-MCNC: 107 NG/ML (ref 0–62)
NEUTROPHILS # BLD AUTO: 11.61 THOU/UL (ref 2–7.15)
NEUTROPHILS NFR BLD AUTO: 73 % (ref 42–76)
PLATELET # BLD AUTO: 322 THOU/UL (ref 130–400)
POTASSIUM SERPL-SCNC: 4.9 MMOL/L (ref 3.5–5.1)
PROT SERPL-MCNC: 8.6 G/DL (ref 6.3–8.2)
PROTHROMBIN TIME: 10.4 SECONDS (ref 9–12.5)
RBC # BLD AUTO: 3.27 MILL/UL (ref 4.2–5.6)
SODIUM SERPL-SCNC: 140 MMOL/L (ref 137–146)

## 2019-12-31 ENCOUNTER — HOSPITAL ENCOUNTER (EMERGENCY)
Age: 64
Discharge: ANOTHER HOSPITAL | End: 2019-12-31
Payer: COMMERCIAL

## 2019-12-31 DIAGNOSIS — E11.22: ICD-10-CM

## 2019-12-31 DIAGNOSIS — Z95.5: ICD-10-CM

## 2019-12-31 DIAGNOSIS — N18.6: ICD-10-CM

## 2019-12-31 DIAGNOSIS — R82.71: ICD-10-CM

## 2019-12-31 DIAGNOSIS — Z99.2: ICD-10-CM

## 2019-12-31 DIAGNOSIS — I12.0: Primary | ICD-10-CM

## 2019-12-31 DIAGNOSIS — J81.1: ICD-10-CM

## 2019-12-31 LAB
ALBUMIN SERPL-MCNC: 4.4 G/DL (ref 3.2–5)
ALP SERPL-CCNC: 131 U/L (ref 38–126)
ANION GAP SERPL CALCULATED.3IONS-SCNC: 21 MMOL/L
AST SERPL-CCNC: 59 U/L (ref 9–36)
BACTERIA #/AREA URNS HPF: (no result) HPF
BASOPHILS NFR BLD AUTO: 1 % (ref 0–3)
BILIRUB UR QL STRIP.AUTO: NEGATIVE
BUN SERPL-MCNC: 60 MG/DL (ref 8–23)
BUN/CREAT SERPL: 13
CHLORIDE SERPL-SCNC: 117 MMOL/L (ref 95–108)
CO2 SERPL-SCNC: 12 MMOL/L (ref 22–30)
COLOR UR AUTO: YELLOW
CREAT SERPL-MCNC: 4.5 MG/DL (ref 0.5–1)
EOSINOPHIL NFR BLD AUTO: 3 % (ref 0–8)
EPI CELLS URNS QL MICRO: (no result) EPI/HPF
ERYTHROCYTE [DISTWIDTH] IN BLOOD BY AUTOMATED COUNT: 16.3 % (ref 11.5–15.5)
GLUCOSE UR STRIP.AUTO-MCNC: NEGATIVE MG/DL
HCT VFR BLD AUTO: 29.9 % (ref 37–47)
HGB BLD-MCNC: 10.2 G/DL (ref 12–16)
HGB UR QL STRIP.AUTO: (no result)
IMM GRANULOCYTES NFR BLD: 0.5 % (ref 0–5)
KETONES UR STRIP.AUTO-MCNC: NEGATIVE MG/DL
LEUKOCYTE ESTERASE UR QL STRIP.AUTO: NEGATIVE
LYMPHOCYTES NFR BLD: 44 % (ref 15–41)
MCH RBC QN AUTO: 30.1 PG  CALC (ref 26–32)
MCHC RBC AUTO-ENTMCNC: 34.1 G/L CALC (ref 32–36)
MCV RBC AUTO: 88.2 FL  CALC (ref 80–100)
MONOCYTES NFR BLD AUTO: 8 % (ref 2–13)
MYOGLOBIN SERPL-MCNC: 119 NG/ML (ref 0–62)
NEUTROPHILS # BLD AUTO: 6.07 THOU/UL (ref 2–7.15)
NEUTROPHILS NFR BLD AUTO: 44 % (ref 42–76)
NITRITE UR QL STRIP.AUTO: NEGATIVE
PH UR STRIP.AUTO: 5.5 [PH] (ref 4.5–8)
PLATELET # BLD AUTO: 361 THOU/UL (ref 130–400)
POTASSIUM SERPL-SCNC: 5.5 MMOL/L (ref 3.5–5.1)
PROT SERPL-MCNC: 8.6 G/DL (ref 6.3–8.2)
PROT UR QL STRIP.AUTO: 30 MG/DL
RBC # BLD AUTO: 3.39 MILL/UL (ref 4.2–5.6)
RBC #/AREA URNS HPF: (no result) RBC/HPF (ref 0–5)
SODIUM SERPL-SCNC: 144 MMOL/L (ref 137–146)
SP GR UR STRIP.AUTO: 1.02
UROBILINOGEN UR QL STRIP.AUTO: 0.2 E.U./DL
WBC #/AREA URNS HPF: (no result) WBC/HPF (ref 0–5)

## 2020-01-28 ENCOUNTER — HOSPITAL ENCOUNTER (OUTPATIENT)
Dept: HOSPITAL 82 - ED | Age: 65
Setting detail: OBSERVATION
LOS: 1 days | Discharge: HOME | End: 2020-01-29
Attending: INTERNAL MEDICINE | Admitting: INTERNAL MEDICINE
Payer: COMMERCIAL

## 2020-01-28 VITALS — SYSTOLIC BLOOD PRESSURE: 178 MMHG | DIASTOLIC BLOOD PRESSURE: 78 MMHG

## 2020-01-28 VITALS — SYSTOLIC BLOOD PRESSURE: 182 MMHG | DIASTOLIC BLOOD PRESSURE: 80 MMHG

## 2020-01-28 VITALS — DIASTOLIC BLOOD PRESSURE: 72 MMHG | SYSTOLIC BLOOD PRESSURE: 166 MMHG

## 2020-01-28 VITALS — WEIGHT: 143.31 LBS | BODY MASS INDEX: 23.88 KG/M2 | HEIGHT: 65 IN

## 2020-01-28 VITALS — SYSTOLIC BLOOD PRESSURE: 189 MMHG | DIASTOLIC BLOOD PRESSURE: 82 MMHG

## 2020-01-28 VITALS — DIASTOLIC BLOOD PRESSURE: 81 MMHG | SYSTOLIC BLOOD PRESSURE: 190 MMHG

## 2020-01-28 DIAGNOSIS — I16.0: Primary | ICD-10-CM

## 2020-01-28 DIAGNOSIS — J44.9: ICD-10-CM

## 2020-01-28 DIAGNOSIS — E11.22: ICD-10-CM

## 2020-01-28 DIAGNOSIS — N18.6: ICD-10-CM

## 2020-01-28 DIAGNOSIS — B19.20: ICD-10-CM

## 2020-01-28 DIAGNOSIS — Z95.5: ICD-10-CM

## 2020-01-28 DIAGNOSIS — Z99.2: ICD-10-CM

## 2020-01-28 DIAGNOSIS — I25.10: ICD-10-CM

## 2020-01-28 DIAGNOSIS — I12.0: ICD-10-CM

## 2020-01-28 LAB
ALBUMIN SERPL-MCNC: 4.8 G/DL (ref 3.2–5)
ALP SERPL-CCNC: 126 U/L (ref 38–126)
ANION GAP SERPL CALCULATED.3IONS-SCNC: 17 MMOL/L
AST SERPL-CCNC: 62 U/L (ref 9–36)
BASOPHILS NFR BLD AUTO: 1 % (ref 0–3)
BUN SERPL-MCNC: 22 MG/DL (ref 8–23)
BUN/CREAT SERPL: 10
CHLORIDE SERPL-SCNC: 106 MMOL/L (ref 95–108)
CO2 SERPL-SCNC: 21 MMOL/L (ref 22–30)
CREAT SERPL-MCNC: 2.3 MG/DL (ref 0.5–1)
EOSINOPHIL NFR BLD AUTO: 3 % (ref 0–8)
ERYTHROCYTE [DISTWIDTH] IN BLOOD BY AUTOMATED COUNT: 14.9 % (ref 11.5–15.5)
HCT VFR BLD AUTO: 30.6 % (ref 37–47)
HGB BLD-MCNC: 10.8 G/DL (ref 12–16)
IMM GRANULOCYTES NFR BLD: 0.4 % (ref 0–5)
LYMPHOCYTES NFR BLD: 31 % (ref 15–41)
MCH RBC QN AUTO: 30.4 PG  CALC (ref 26–32)
MCHC RBC AUTO-ENTMCNC: 35.3 G/L CALC (ref 32–36)
MCV RBC AUTO: 86.2 FL  CALC (ref 80–100)
MONOCYTES NFR BLD AUTO: 9 % (ref 2–13)
NEUTROPHILS # BLD AUTO: 2.54 THOU/UL (ref 2–7.15)
NEUTROPHILS NFR BLD AUTO: 55 % (ref 42–76)
PLATELET # BLD AUTO: 231 THOU/UL (ref 130–400)
POTASSIUM SERPL-SCNC: 4.9 MMOL/L (ref 3.5–5.1)
PROT SERPL-MCNC: 9.3 G/DL (ref 6.3–8.2)
RBC # BLD AUTO: 3.55 MILL/UL (ref 4.2–5.6)
SODIUM SERPL-SCNC: 139 MMOL/L (ref 137–146)

## 2020-01-28 NOTE — NUR
CALL PLACED TO ICU.  NO ANSWER AT THIS TIME.  UNABLE TO GIVE REPORT.  PT GIVEN
GINGER ALE AND ICE PER HER REQUEST

## 2020-01-28 NOTE — NUR
PT AMBULATORY TO STRETCHER WITH MINIMAL ASSISTANCE.  PT AO X 3. SKIN PINK WARM
AND DRY.  RESP EVEN AND UNLABORED.  PT REPORTS FEELING DIZZY AND HAVING HIGH
BLOOD PRESSURE.  DID NOT TAKE HTN MED THIS AM BEFORE DIALYSIS TREATMNET.

## 2020-01-28 NOTE — NUR
NITRO DRIP DECREASED TO 15 MCG AT THIS TIME.  PT. REPORTS 6/10 HEADACHE.
MEDICATED PER PHYSICIAN ORDERS.  PT. DENIES OTHER COMPLAINTS OR NEEDS AT THIS
TIME.  CALL LIGHT REMAINS WITHIN REACH.  VOICES NO OTHER COMPLAINTS OR NEEDS.

## 2020-01-28 NOTE — NUR
NTG DRIP STARTED AT 5MCG/MIN /101.  
1654  NTG INCREASED TO 10 MCG/MIN FOR /101
1659 NTG INCREASE TO 15 MCG/MIN FOR /101
1718 NTG INCREASED TO 20 MCG/MIN FOR /95
1723  /86  WILL CONTINUE TO MONITOR PT.  \
PT AWAKE, ALERT, RESTING ON STRETCHER, TALKING ON PHONE.

## 2020-01-28 NOTE — NUR
PT. REQUESTING MEDICATION FOR SLEEP.  NEW ORDERS RECIEVED AT THIS TIME.  WILL
MEDICATE AS ORDERS.  PT. STATES HER HEADACHE IS IMPROVED.  REMAINS ON 15 MCG
NITRO AT THIS TIME.

## 2020-01-28 NOTE — NUR
PT. ARRIVES BY STRETCHER FROM ER.  AMBULATORY FROM ER STRETCHER TO ICU BED.
PT. AMBULATORY WITHOUT ASSIST.  ARRIVES WITH EMPTY 250 ML NS BAG AND NITRO
DRIP INFUSING AT 25 MCG.  RESPS EVEN AND UNLABORED.  LUNGS CTA, DIMINISHED
BASES.  DISTIL PULSES INTACT.  BOWEL SOUNDS ACTIVE. AFEBRILE ON ARRIVAL TO
UNIT.  VOICES NO COMPLAINTS OR NEEDS AT THIS TIME.  S1, S2.  SINUS RHYTHM ON
ARRIVAL.  PT. REPORTS SHE TOOK HER LISINOPRIL AT 4AM ALONG WITH ONE CLONIDINE,
BUT DID NOT TAKE ANY MORE CLONIDINE THROUGHOUT THE DAY.  REPORTS MILD
GENERALIZED WEAKNESS.  PT. STATES THATS NORMAL AFTER DIALYSIS.

## 2020-01-29 VITALS — SYSTOLIC BLOOD PRESSURE: 173 MMHG | DIASTOLIC BLOOD PRESSURE: 78 MMHG

## 2020-01-29 VITALS — SYSTOLIC BLOOD PRESSURE: 171 MMHG | DIASTOLIC BLOOD PRESSURE: 74 MMHG

## 2020-01-29 VITALS — SYSTOLIC BLOOD PRESSURE: 179 MMHG | DIASTOLIC BLOOD PRESSURE: 77 MMHG

## 2020-01-29 VITALS — SYSTOLIC BLOOD PRESSURE: 185 MMHG | DIASTOLIC BLOOD PRESSURE: 82 MMHG

## 2020-01-29 VITALS — SYSTOLIC BLOOD PRESSURE: 177 MMHG | DIASTOLIC BLOOD PRESSURE: 79 MMHG

## 2020-01-29 VITALS — DIASTOLIC BLOOD PRESSURE: 84 MMHG | SYSTOLIC BLOOD PRESSURE: 178 MMHG

## 2020-01-29 VITALS — DIASTOLIC BLOOD PRESSURE: 77 MMHG | SYSTOLIC BLOOD PRESSURE: 174 MMHG

## 2020-01-29 VITALS — DIASTOLIC BLOOD PRESSURE: 72 MMHG | SYSTOLIC BLOOD PRESSURE: 161 MMHG

## 2020-01-29 VITALS — SYSTOLIC BLOOD PRESSURE: 179 MMHG | DIASTOLIC BLOOD PRESSURE: 79 MMHG

## 2020-01-29 VITALS — SYSTOLIC BLOOD PRESSURE: 173 MMHG | DIASTOLIC BLOOD PRESSURE: 75 MMHG

## 2020-01-29 VITALS — DIASTOLIC BLOOD PRESSURE: 85 MMHG | SYSTOLIC BLOOD PRESSURE: 189 MMHG

## 2020-01-29 VITALS — DIASTOLIC BLOOD PRESSURE: 67 MMHG | SYSTOLIC BLOOD PRESSURE: 150 MMHG

## 2020-01-29 VITALS — SYSTOLIC BLOOD PRESSURE: 181 MMHG | DIASTOLIC BLOOD PRESSURE: 79 MMHG

## 2020-01-29 VITALS — DIASTOLIC BLOOD PRESSURE: 78 MMHG | SYSTOLIC BLOOD PRESSURE: 177 MMHG

## 2020-01-29 VITALS — DIASTOLIC BLOOD PRESSURE: 70 MMHG | SYSTOLIC BLOOD PRESSURE: 158 MMHG

## 2020-01-29 VITALS — SYSTOLIC BLOOD PRESSURE: 171 MMHG | DIASTOLIC BLOOD PRESSURE: 72 MMHG

## 2020-01-29 VITALS — DIASTOLIC BLOOD PRESSURE: 76 MMHG | SYSTOLIC BLOOD PRESSURE: 175 MMHG

## 2020-01-29 VITALS — SYSTOLIC BLOOD PRESSURE: 172 MMHG | DIASTOLIC BLOOD PRESSURE: 77 MMHG

## 2020-01-29 VITALS — DIASTOLIC BLOOD PRESSURE: 61 MMHG | SYSTOLIC BLOOD PRESSURE: 178 MMHG

## 2020-01-29 VITALS — DIASTOLIC BLOOD PRESSURE: 73 MMHG | SYSTOLIC BLOOD PRESSURE: 162 MMHG

## 2020-01-29 VITALS — SYSTOLIC BLOOD PRESSURE: 168 MMHG | DIASTOLIC BLOOD PRESSURE: 74 MMHG

## 2020-01-29 VITALS — DIASTOLIC BLOOD PRESSURE: 68 MMHG | SYSTOLIC BLOOD PRESSURE: 150 MMHG

## 2020-01-29 VITALS — SYSTOLIC BLOOD PRESSURE: 154 MMHG | DIASTOLIC BLOOD PRESSURE: 70 MMHG

## 2020-01-29 LAB
ANION GAP SERPL CALCULATED.3IONS-SCNC: 16 MMOL/L
BASOPHILS NFR BLD AUTO: 1 % (ref 0–3)
BUN SERPL-MCNC: 30 MG/DL (ref 8–23)
BUN/CREAT SERPL: 10
CHLORIDE SERPL-SCNC: 109 MMOL/L (ref 95–108)
CO2 SERPL-SCNC: 18 MMOL/L (ref 22–30)
CREAT SERPL-MCNC: 3 MG/DL (ref 0.5–1)
EOSINOPHIL NFR BLD AUTO: 4 % (ref 0–8)
ERYTHROCYTE [DISTWIDTH] IN BLOOD BY AUTOMATED COUNT: 14.4 % (ref 11.5–15.5)
HCT VFR BLD AUTO: 28.3 % (ref 37–47)
HGB BLD-MCNC: 10 G/DL (ref 12–16)
IMM GRANULOCYTES NFR BLD: 0.3 % (ref 0–5)
LYMPHOCYTES NFR BLD: 23 % (ref 15–41)
MCH RBC QN AUTO: 30.7 PG  CALC (ref 26–32)
MCHC RBC AUTO-ENTMCNC: 35.3 G/L CALC (ref 32–36)
MCV RBC AUTO: 86.8 FL  CALC (ref 80–100)
MONOCYTES NFR BLD AUTO: 11 % (ref 2–13)
NEUTROPHILS # BLD AUTO: 3.65 THOU/UL (ref 2–7.15)
NEUTROPHILS NFR BLD AUTO: 61 % (ref 42–76)
PLATELET # BLD AUTO: 233 THOU/UL (ref 130–400)
POTASSIUM SERPL-SCNC: 4 MMOL/L (ref 3.5–5.1)
RBC # BLD AUTO: 3.26 MILL/UL (ref 4.2–5.6)
SODIUM SERPL-SCNC: 139 MMOL/L (ref 137–146)

## 2020-01-29 NOTE — NUR
PT. BP /85.  NITRO DRIP INCREASED BACK TO 25 MCG AT THIS TIME.  CALL
LIGHT REMAINS WITHIN REACH.  PT. VOICES NO OTHER COMPLAINTS OR NEEDS AT THIS
TIME.

## 2020-01-29 NOTE — NUR
LAB AT BEDSIDE TO DRAW PATIENT.  SHE REMAINS EASILY AROUSABLE TO LIGHT VERBAL
STIMULI.  CALL LIGHT REMAINS WITHIN REACH.  WILL CONTINUE TO CLOSELY MONITOR.

## 2020-01-29 NOTE — NUR
PT RESTING ASKING ABOUT D/C EDUCATED REGARDING CONVERSATION WITH RUTH MORAN AND
PLAN FOR DINNER TIME, PT VERBALIZES UNDERSTANDING

## 2020-01-29 NOTE — NUR
PT RESTING ASKING ABOUT D/C NITRO GTT REMAINS OFF SINCE 1100 WTIH GOOD
TOLERANCE BY PT, IV SITES REMAIN INTACT, CALL BELL WITHIN REACH

## 2020-01-29 NOTE — NUR
PT. RESTING IN BED ON LT. SIDE IN NO DISTRESS.  PT. VOICES NO COMPLAINTS OR
NEEDS AT THIS TIME.  REMAINS SINUS ON THE MONITOR WITH PAC'S.  PT. REMAINS
STABLE WITH NITRO DRIP INFUSING AT 15 MCG AT THIS TIME.

## 2020-01-29 NOTE — NUR
PT. ASSISTED TO BSC AT THIS TIME.  REMAINS STABLE ON THE MONITOR.  NITRO DRIP
CONTINUES AT 15 MCG AT THIS TIME.  BP REMAINS ELEVATED.

## 2020-01-29 NOTE — NUR
PT RESTING IN BED ALERT AND ORIENTED;  PT. AMBULATORY WITHOUT ASSIST.  NITRO
DRIP CONTINUES INFUSING AT 25 MCG.  RESPS EVEN AND UNLABORED.  LUNGS CTA,
DIMINISHED BASES.  DISTAL PULSES INTACT.  BOWEL SOUNDS ACTIVE.  VOICES NO
COMPLAINTS OR NEEDS AT THIS TIME.  REMAINS SINUS RHYTHM ON MONIITOR
MINIMAL GENERALIZED WEAKNESS PER PT BUT .  PT. STATES THATS NORMAL AFTER
DIALYSIS AND IS IMPROVED THIS AM. ASKING ABOUT D/C EDUCATED REGARDING D/C PLAN
AND PROCESS VERBALIZES UNDERSTANDDING

## 2020-01-29 NOTE — NUR
PT. REMAINS RESTING IN BED WITH EYES CLOSED IN NO DISTRESS.  RESPS EVEN AND
UNLABORED.  POSITIONED ON HER LT. SIDE.  IV NITRO INFUSING AS DIRECTED.  BP
REMAINS SLIGHTLY ELEVATED.  WILL CONTINUE TO MONITOR.

## 2020-01-29 NOTE — NUR
PT FREQUENTLY ASKING WHEN DR MIRANDA WILL BE BACK TO D/C HER. PT REPEATEDLY
ADVISED MD WILL NOT BE BACK UNTIL AFTER OFFICE HOURS. PT STATES "SHE GETS
TRAPPED EVERYTIME SHE COMES TO [place]." ALSO, "IF SHE DOESNT GET DISCHARGED BY
6PM SHE IS JUST GOING TO LEAVE". PTS RN AWARE.

## 2020-01-29 NOTE — NUR
PT RESTING EYES CLOSED, RESPS EVEN AND UNLABORED, BP REMAINS STABLE, 
PLANNING D/C AT DINNER TIME PT AWARE

## 2020-01-29 NOTE — NUR
PT. RESTING IN BED IN NO DISTRESS.  RESPS REMAIN EVEN AND UNLABORED.  BP
REMAINS ELEVATED  SYSTOLIC.  WILL MEDICATE AS ORDERED.

## 2020-01-29 NOTE — NUR
PT OOB TO BEDSIDE COMMODE INDEPENDENTLY WITH STEADY GAIT. TESSIO CATH INTACT
IN R CHEST WALL. DIALYSIS COMPLETED YESTERDAY. WILL CONTINUE TO MONITOR

## 2020-03-16 ENCOUNTER — HOSPITAL ENCOUNTER (EMERGENCY)
Age: 65
Discharge: HOME | End: 2020-03-16
Payer: COMMERCIAL

## 2020-03-16 DIAGNOSIS — B34.9: ICD-10-CM

## 2020-03-16 DIAGNOSIS — Z91.128: ICD-10-CM

## 2020-03-16 DIAGNOSIS — Z99.2: ICD-10-CM

## 2020-03-16 DIAGNOSIS — T46.4X6A: ICD-10-CM

## 2020-03-16 DIAGNOSIS — N18.6: ICD-10-CM

## 2020-03-16 DIAGNOSIS — I13.2: Primary | ICD-10-CM

## 2020-03-16 DIAGNOSIS — E11.22: ICD-10-CM

## 2020-03-16 LAB
ALBUMIN SERPL-MCNC: 4.3 G/DL (ref 3.2–5)
ALP SERPL-CCNC: 101 U/L (ref 38–126)
ANION GAP SERPL CALCULATED.3IONS-SCNC: 16 MMOL/L
AST SERPL-CCNC: 27 U/L (ref 9–36)
BASOPHILS NFR BLD AUTO: 1 % (ref 0–3)
BUN SERPL-MCNC: 38 MG/DL (ref 8–23)
BUN/CREAT SERPL: 12
CHLORIDE SERPL-SCNC: 112 MMOL/L (ref 95–108)
CO2 SERPL-SCNC: 17 MMOL/L (ref 22–30)
CREAT SERPL-MCNC: 3.1 MG/DL (ref 0.5–1)
EOSINOPHIL NFR BLD AUTO: 1 % (ref 0–8)
ERYTHROCYTE [DISTWIDTH] IN BLOOD BY AUTOMATED COUNT: 14.4 % (ref 11.5–15.5)
HCT VFR BLD AUTO: 31.4 % (ref 37–47)
HGB BLD-MCNC: 11 G/DL (ref 12–16)
IMM GRANULOCYTES NFR BLD: 0.4 % (ref 0–5)
LYMPHOCYTES NFR BLD: 13 % (ref 15–41)
MAGNESIUM SERPL-MCNC: 2 MG/DL (ref 1.6–2.3)
MCH RBC QN AUTO: 29.9 PG  CALC (ref 26–32)
MCHC RBC AUTO-ENTMCNC: 35 G/L CALC (ref 32–36)
MCV RBC AUTO: 85.3 FL  CALC (ref 80–100)
MONOCYTES NFR BLD AUTO: 10 % (ref 2–13)
NEUTROPHILS # BLD AUTO: 6.32 THOU/UL (ref 2–7.15)
NEUTROPHILS NFR BLD AUTO: 75 % (ref 42–76)
PLATELET # BLD AUTO: 261 THOU/UL (ref 130–400)
POTASSIUM SERPL-SCNC: 3.8 MMOL/L (ref 3.5–5.1)
PROT SERPL-MCNC: 7.8 G/DL (ref 6.3–8.2)
RBC # BLD AUTO: 3.68 MILL/UL (ref 4.2–5.6)
SODIUM SERPL-SCNC: 141 MMOL/L (ref 137–146)

## 2020-03-18 NOTE — NUR
PT BLOOD CULTURE SHOWS GRAM POSITIVE COCCI IN 1/4 BOTTLES. TRIED CALLING PT 2X
TO SEE HOW SHE IS FEELING AND FIND OUT WHO HER PCP IS TO CONTACT WITH PRELIM
RESULTS. NO ANSWER, LEFT VM. WILL F/U TOMORROW 3/19

## 2020-03-19 NOTE — NUR
BLOOD CULTURE SHOWS STAPH HOMINIS IN 1 SET. SPOKE WITH DR BRADSHAW, STATES
LIKELY A CONTAMINANT. TRIED TO CALL PT TO FOLLOW UP, LEFT MARYA 3/19 TO
CALL BACK.

## 2020-04-13 ENCOUNTER — HOSPITAL ENCOUNTER (EMERGENCY)
Age: 65
LOS: 1 days | Discharge: ANOTHER HOSPITAL | End: 2020-04-14
Payer: COMMERCIAL

## 2020-04-13 DIAGNOSIS — I13.0: Primary | ICD-10-CM

## 2020-04-13 DIAGNOSIS — N18.6: ICD-10-CM

## 2020-04-13 DIAGNOSIS — E11.22: ICD-10-CM

## 2020-04-13 DIAGNOSIS — Z99.2: ICD-10-CM

## 2020-04-13 DIAGNOSIS — N18.9: ICD-10-CM

## 2020-04-13 DIAGNOSIS — I16.1: ICD-10-CM

## 2020-04-13 DIAGNOSIS — I50.9: ICD-10-CM

## 2020-04-13 DIAGNOSIS — Z20.828: ICD-10-CM

## 2020-04-13 LAB
ALBUMIN SERPL-MCNC: 4.6 G/DL (ref 3.2–5)
ALP SERPL-CCNC: 110 U/L (ref 38–126)
ANION GAP SERPL CALCULATED.3IONS-SCNC: 16 MMOL/L
ANION GAP SERPL CALCULATED.3IONS-SCNC: 20 MMOL/L
AST SERPL-CCNC: 34 U/L (ref 9–36)
BASOPHILS NFR BLD AUTO: 1 % (ref 0–3)
BUN SERPL-MCNC: 40 MG/DL (ref 8–23)
BUN SERPL-MCNC: 41 MG/DL (ref 8–23)
BUN/CREAT SERPL: 12
BUN/CREAT SERPL: 13
CHLORIDE SERPL-SCNC: 111 MMOL/L (ref 95–108)
CHLORIDE SERPL-SCNC: 111 MMOL/L (ref 95–108)
CO2 SERPL-SCNC: 18 MMOL/L (ref 22–30)
CO2 SERPL-SCNC: 18 MMOL/L (ref 22–30)
CREAT SERPL-MCNC: 3.2 MG/DL (ref 0.5–1)
CREAT SERPL-MCNC: 3.5 MG/DL (ref 0.5–1)
EOSINOPHIL NFR BLD AUTO: 2 % (ref 0–8)
ERYTHROCYTE [DISTWIDTH] IN BLOOD BY AUTOMATED COUNT: 14.6 % (ref 11.5–15.5)
HCT VFR BLD AUTO: 30.1 % (ref 37–47)
HGB BLD-MCNC: 10.4 G/DL (ref 12–16)
IMM GRANULOCYTES NFR BLD: 0.4 % (ref 0–5)
INR PPP: 1 RATIO (ref 0.7–1.3)
LYMPHOCYTES NFR BLD: 18 % (ref 15–41)
MCH RBC QN AUTO: 29.6 PG  CALC (ref 26–32)
MCHC RBC AUTO-ENTMCNC: 34.6 G/DL CAL (ref 32–36)
MCV RBC AUTO: 85.8 FL  CALC (ref 80–100)
MONOCYTES NFR BLD AUTO: 11 % (ref 2–13)
MYOGLOBIN SERPL-MCNC: 84 NG/ML (ref 0–62)
NEUTROPHILS # BLD AUTO: 5.52 THOU/UL (ref 2–7.15)
NEUTROPHILS NFR BLD AUTO: 68 % (ref 42–76)
PLATELET # BLD AUTO: 258 THOU/UL (ref 130–400)
POTASSIUM SERPL-SCNC: 4.3 MMOL/L (ref 3.5–5.1)
POTASSIUM SERPL-SCNC: 4.3 MMOL/L (ref 3.5–5.1)
PROT SERPL-MCNC: 9 G/DL (ref 6.3–8.2)
PROTHROMBIN TIME: 10.4 SECONDS (ref 9–12.5)
RBC # BLD AUTO: 3.51 MILL/UL (ref 4.2–5.6)
SODIUM SERPL-SCNC: 141 MMOL/L (ref 137–146)
SODIUM SERPL-SCNC: 144 MMOL/L (ref 137–146)

## 2020-04-13 NOTE — NUR
MD IN ROOM TO DISCUSS CLINICAL FINDINGS WITH PT. AND ALSO MAKE PT. AWARE OF
ADMISSION. VERBALIZED UNDERSTANDING.

## 2020-04-14 NOTE — NUR
[facility] HERE TO TRANSFER PT. TO [facility], REPORT GIVEN.

## 2020-04-14 NOTE — NUR
PT. AWOKE QUESTIONED ABOUT HER TRANSFER PT. INSTRUCTED THAT WE ARE STILL
AWAITING ACEPTANCE FROM [facility], PT. STATED, DAM IT ALREADY." REASSURANCE GIVEN
TO PT.

## 2020-04-14 NOTE — NUR
PT. RESTING QUIETLY EYES CLOSED, NO C/O SOB OFFERED, NO C/O BACK PAIN OFFERED,
AWAKENS EASILY. V/S STABLE.

## 2020-04-14 NOTE — NUR
Transfer Information
 
Transferred To:             [facility]
Report Given to:            STEVE SMALL
Transported by:          [facility]           [facility]
                          Air                  Other
 
Transported with:         Nurse    X Transporter   X Patent IV   X O2
                         X Cardiac Monitor

## 2020-04-14 NOTE — NUR
CALLED DR MIRANDA INFORMED OF PT REFUSING TO STAY WANTS TO GO TO [facility]
BECAUSE SHE NEEDS DIALYSIS.  PT IS SCHEDULED FOR THE MORNING.  ORDER FOR
TRANSFER TO [facility] RECEIVED

## 2020-04-14 NOTE — NUR
DR BELLA DECLINED PT FOR TRANSFER.  DR MIRANDA AWARE AND ORDER TO TRANSFER PT
TO [facility] RECEIVED.

## 2020-04-14 NOTE — NUR
PT. STATING I WANT TO [facility], I NEED DIALYSIS IN THE AM. MD AWARE.
CHARGE  NURSE CALLING ON CALL MD DR FARAH.

## 2020-10-09 ENCOUNTER — HOSPITAL ENCOUNTER (EMERGENCY)
Dept: HOSPITAL 82 - ED | Age: 65
Discharge: LEFT BEFORE DISCHARGE | End: 2020-10-09
Payer: COMMERCIAL

## 2020-10-09 VITALS — HEIGHT: 65 IN | WEIGHT: 150.36 LBS | BODY MASS INDEX: 25.05 KG/M2

## 2020-10-09 VITALS — SYSTOLIC BLOOD PRESSURE: 134 MMHG | DIASTOLIC BLOOD PRESSURE: 70 MMHG

## 2020-10-09 DIAGNOSIS — N18.6: ICD-10-CM

## 2020-10-09 DIAGNOSIS — Z91.15: ICD-10-CM

## 2020-10-09 DIAGNOSIS — I12.0: ICD-10-CM

## 2020-10-09 DIAGNOSIS — Z95.5: ICD-10-CM

## 2020-10-09 DIAGNOSIS — Z99.2: ICD-10-CM

## 2020-10-09 DIAGNOSIS — Z91.19: ICD-10-CM

## 2020-10-09 DIAGNOSIS — J81.1: Primary | ICD-10-CM

## 2020-10-09 DIAGNOSIS — E11.22: ICD-10-CM

## 2020-10-09 LAB
ALBUMIN SERPL-MCNC: 4.3 G/DL (ref 3.2–5)
ALP SERPL-CCNC: 73 U/L (ref 38–126)
ANION GAP SERPL CALCULATED.3IONS-SCNC: 19 MMOL/L
AST SERPL-CCNC: 20 U/L (ref 9–36)
BASOPHILS NFR BLD AUTO: 1 % (ref 0–3)
BUN SERPL-MCNC: 63 MG/DL (ref 8–23)
BUN/CREAT SERPL: 11
CHLORIDE SERPL-SCNC: 110 MMOL/L (ref 95–108)
CO2 SERPL-SCNC: 15 MMOL/L (ref 22–30)
CREAT SERPL-MCNC: 5.8 MG/DL (ref 0.5–1)
EOSINOPHIL NFR BLD AUTO: 1 % (ref 0–8)
ERYTHROCYTE [DISTWIDTH] IN BLOOD BY AUTOMATED COUNT: 14 % (ref 11.5–15.5)
HCT VFR BLD AUTO: 27.5 % (ref 37–47)
HGB BLD-MCNC: 9.4 G/DL (ref 12–16)
IMM GRANULOCYTES NFR BLD: 0.4 % (ref 0–5)
LYMPHOCYTES NFR BLD: 16 % (ref 15–41)
MCH RBC QN AUTO: 29.7 PG  CALC (ref 26–32)
MCHC RBC AUTO-ENTMCNC: 34.2 G/DL CAL (ref 32–36)
MCV RBC AUTO: 87 FL  CALC (ref 80–100)
MONOCYTES NFR BLD AUTO: 9 % (ref 2–13)
NEUTROPHILS # BLD AUTO: 6.9 THOU/UL (ref 2–7.15)
NEUTROPHILS NFR BLD AUTO: 73 % (ref 42–76)
PLATELET # BLD AUTO: 230 THOU/UL (ref 130–400)
POTASSIUM SERPL-SCNC: 4.8 MMOL/L (ref 3.5–5.1)
PROT SERPL-MCNC: 8.3 G/DL (ref 6.3–8.2)
RBC # BLD AUTO: 3.16 MILL/UL (ref 4.2–5.6)
SODIUM SERPL-SCNC: 139 MMOL/L (ref 137–146)

## 2020-10-15 ENCOUNTER — HOSPITAL ENCOUNTER (EMERGENCY)
Dept: HOSPITAL 82 - ED | Age: 65
Discharge: LEFT BEFORE DISCHARGE | End: 2020-10-15
Payer: COMMERCIAL

## 2020-10-15 VITALS — DIASTOLIC BLOOD PRESSURE: 75 MMHG | SYSTOLIC BLOOD PRESSURE: 174 MMHG

## 2020-10-15 VITALS — WEIGHT: 150.36 LBS | HEIGHT: 65 IN | BODY MASS INDEX: 25.05 KG/M2

## 2020-10-15 DIAGNOSIS — I50.9: ICD-10-CM

## 2020-10-15 DIAGNOSIS — F17.200: ICD-10-CM

## 2020-10-15 DIAGNOSIS — E11.22: ICD-10-CM

## 2020-10-15 DIAGNOSIS — Z99.2: ICD-10-CM

## 2020-10-15 DIAGNOSIS — I16.1: Primary | ICD-10-CM

## 2020-10-15 DIAGNOSIS — N18.6: ICD-10-CM

## 2020-10-15 DIAGNOSIS — Z91.19: ICD-10-CM

## 2020-10-15 DIAGNOSIS — Z95.5: ICD-10-CM

## 2020-10-15 DIAGNOSIS — K74.60: ICD-10-CM

## 2020-10-15 DIAGNOSIS — I13.2: ICD-10-CM

## 2020-10-15 LAB
ALBUMIN SERPL-MCNC: 4.3 G/DL (ref 3.2–5)
ALP SERPL-CCNC: 86 U/L (ref 38–126)
ANION GAP SERPL CALCULATED.3IONS-SCNC: 19 MMOL/L
APTT PPP: 25.8 SECONDS (ref 20–32.5)
AST SERPL-CCNC: 21 U/L (ref 9–36)
BACTERIA #/AREA URNS HPF: (no result) HPF
BASOPHILS NFR BLD AUTO: 1 % (ref 0–3)
BILIRUB UR QL STRIP.AUTO: NEGATIVE
BUN SERPL-MCNC: 40 MG/DL (ref 8–23)
BUN/CREAT SERPL: 9
CHLORIDE SERPL-SCNC: 107 MMOL/L (ref 95–108)
CO2 SERPL-SCNC: 17 MMOL/L (ref 22–30)
COLOR UR AUTO: YELLOW
CREAT SERPL-MCNC: 4.3 MG/DL (ref 0.5–1)
EOSINOPHIL NFR BLD AUTO: 2 % (ref 0–8)
ERYTHROCYTE [DISTWIDTH] IN BLOOD BY AUTOMATED COUNT: 14.1 % (ref 11.5–15.5)
GLUCOSE UR STRIP.AUTO-MCNC: NEGATIVE MG/DL
HCT VFR BLD AUTO: 25.9 % (ref 37–47)
HGB BLD-MCNC: 8.8 G/DL (ref 12–16)
HGB UR QL STRIP.AUTO: (no result)
IMM GRANULOCYTES NFR BLD: 0.9 % (ref 0–5)
INR PPP: 1.1 RATIO (ref 0.7–1.3)
KETONES UR STRIP.AUTO-MCNC: NEGATIVE MG/DL
LEUKOCYTE ESTERASE UR QL STRIP.AUTO: (no result)
LIPASE SERPL-CCNC: 52 U/L (ref 23–300)
LYMPHOCYTES NFR BLD: 10 % (ref 15–41)
MCH RBC QN AUTO: 29.5 PG  CALC (ref 26–32)
MCHC RBC AUTO-ENTMCNC: 34 G/DL CAL (ref 32–36)
MCV RBC AUTO: 86.9 FL  CALC (ref 80–100)
MONOCYTES NFR BLD AUTO: 6 % (ref 2–13)
NEUTROPHILS # BLD AUTO: 8.95 THOU/UL (ref 2–7.15)
NEUTROPHILS NFR BLD AUTO: 81 % (ref 42–76)
NITRITE UR QL STRIP.AUTO: NEGATIVE
PH UR STRIP.AUTO: 7 [PH] (ref 4.5–8)
PLATELET # BLD AUTO: 268 THOU/UL (ref 130–400)
POTASSIUM SERPL-SCNC: 3.8 MMOL/L (ref 3.5–5.1)
PROT SERPL-MCNC: 8.1 G/DL (ref 6.3–8.2)
PROT UR QL STRIP.AUTO: 100 MG/DL
PROTHROMBIN TIME: 10.6 SECONDS (ref 9–12.5)
RBC # BLD AUTO: 2.98 MILL/UL (ref 4.2–5.6)
RBC #/AREA URNS HPF: (no result) RBC/HPF (ref 0–5)
SODIUM SERPL-SCNC: 139 MMOL/L (ref 137–146)
SP GR UR STRIP.AUTO: 1.02
SQUAMOUS URNS QL MICRO: (no result) EPI/HPF
UROBILINOGEN UR QL STRIP.AUTO: 0.2 E.U./DL
WBC #/AREA URNS HPF: (no result) WBC/HPF (ref 0–5)

## 2020-10-19 ENCOUNTER — HOSPITAL ENCOUNTER (EMERGENCY)
Dept: HOSPITAL 82 - ED | Age: 65
LOS: 1 days | Discharge: NURSING, REHAB OR LONG-TERM CARE | End: 2020-10-20
Payer: COMMERCIAL

## 2020-10-19 VITALS — BODY MASS INDEX: 25.05 KG/M2 | HEIGHT: 65 IN | WEIGHT: 150.36 LBS

## 2020-10-19 DIAGNOSIS — I50.9: ICD-10-CM

## 2020-10-19 DIAGNOSIS — F32.9: ICD-10-CM

## 2020-10-19 DIAGNOSIS — I13.2: ICD-10-CM

## 2020-10-19 DIAGNOSIS — Z95.5: ICD-10-CM

## 2020-10-19 DIAGNOSIS — J18.9: ICD-10-CM

## 2020-10-19 DIAGNOSIS — I16.1: Primary | ICD-10-CM

## 2020-10-19 DIAGNOSIS — Z91.19: ICD-10-CM

## 2020-10-19 DIAGNOSIS — E11.22: ICD-10-CM

## 2020-10-19 DIAGNOSIS — F17.210: ICD-10-CM

## 2020-10-19 DIAGNOSIS — J96.00: ICD-10-CM

## 2020-10-19 DIAGNOSIS — Z99.2: ICD-10-CM

## 2020-10-19 DIAGNOSIS — K74.60: ICD-10-CM

## 2020-10-19 DIAGNOSIS — N18.6: ICD-10-CM

## 2020-10-19 DIAGNOSIS — Z20.828: ICD-10-CM

## 2020-10-19 LAB
ALBUMIN SERPL-MCNC: 4.4 G/DL (ref 3.2–5)
ALP SERPL-CCNC: 81 U/L (ref 38–126)
ANION GAP SERPL CALCULATED.3IONS-SCNC: 15 MMOL/L
APTT PPP: 24 SECONDS (ref 20–32.5)
AST SERPL-CCNC: 23 U/L (ref 9–36)
BASOPHILS NFR BLD AUTO: 0 % (ref 0–3)
BUN SERPL-MCNC: 45 MG/DL (ref 8–23)
BUN/CREAT SERPL: 10
CHLORIDE SERPL-SCNC: 109 MMOL/L (ref 95–108)
CO2 SERPL-SCNC: 18 MMOL/L (ref 22–30)
CREAT SERPL-MCNC: 4.6 MG/DL (ref 0.5–1)
EOSINOPHIL NFR BLD AUTO: 1 % (ref 0–8)
ERYTHROCYTE [DISTWIDTH] IN BLOOD BY AUTOMATED COUNT: 14.2 % (ref 11.5–15.5)
HCT VFR BLD AUTO: 25.6 % (ref 37–47)
HGB BLD-MCNC: 8.7 G/DL (ref 12–16)
IMM GRANULOCYTES NFR BLD: 0.7 % (ref 0–5)
INR PPP: 1.1 RATIO (ref 0.7–1.3)
LIPASE SERPL-CCNC: 50 U/L (ref 23–300)
LYMPHOCYTES NFR BLD: 7 % (ref 15–41)
MCH RBC QN AUTO: 29.3 PG  CALC (ref 26–32)
MCHC RBC AUTO-ENTMCNC: 34 G/DL CAL (ref 32–36)
MCV RBC AUTO: 86.2 FL  CALC (ref 80–100)
MONOCYTES NFR BLD AUTO: 5 % (ref 2–13)
NEUTROPHILS # BLD AUTO: 12.91 THOU/UL (ref 2–7.15)
NEUTROPHILS NFR BLD AUTO: 86 % (ref 42–76)
PLATELET # BLD AUTO: 320 THOU/UL (ref 130–400)
POTASSIUM SERPL-SCNC: 3.9 MMOL/L (ref 3.5–5.1)
PROT SERPL-MCNC: 8.6 G/DL (ref 6.3–8.2)
PROTHROMBIN TIME: 10.8 SECONDS (ref 9–12.5)
RBC # BLD AUTO: 2.97 MILL/UL (ref 4.2–5.6)
SODIUM SERPL-SCNC: 138 MMOL/L (ref 137–146)

## 2020-10-20 VITALS — SYSTOLIC BLOOD PRESSURE: 147 MMHG | DIASTOLIC BLOOD PRESSURE: 67 MMHG

## 2020-10-28 ENCOUNTER — HOSPITAL ENCOUNTER (EMERGENCY)
Dept: HOSPITAL 82 - ED | Age: 65
LOS: 1 days | Discharge: ANOTHER HOSPITAL | End: 2020-10-29
Payer: COMMERCIAL

## 2020-10-28 VITALS — BODY MASS INDEX: 25.05 KG/M2 | WEIGHT: 150.36 LBS | HEIGHT: 65 IN

## 2020-10-28 DIAGNOSIS — F32.9: ICD-10-CM

## 2020-10-28 DIAGNOSIS — I16.1: ICD-10-CM

## 2020-10-28 DIAGNOSIS — J18.9: Primary | ICD-10-CM

## 2020-10-28 DIAGNOSIS — E11.22: ICD-10-CM

## 2020-10-28 DIAGNOSIS — N18.6: ICD-10-CM

## 2020-10-28 DIAGNOSIS — Z95.5: ICD-10-CM

## 2020-10-28 DIAGNOSIS — Z99.2: ICD-10-CM

## 2020-10-28 DIAGNOSIS — F17.210: ICD-10-CM

## 2020-10-28 DIAGNOSIS — Z20.828: ICD-10-CM

## 2020-10-28 DIAGNOSIS — I12.0: ICD-10-CM

## 2020-10-28 DIAGNOSIS — K74.60: ICD-10-CM

## 2020-10-29 VITALS — DIASTOLIC BLOOD PRESSURE: 76 MMHG | SYSTOLIC BLOOD PRESSURE: 172 MMHG

## 2020-10-29 LAB
ALBUMIN SERPL-MCNC: 4.7 G/DL (ref 3.2–5)
ALP SERPL-CCNC: 83 U/L (ref 38–126)
ANION GAP SERPL CALCULATED.3IONS-SCNC: 19 MMOL/L
AST SERPL-CCNC: 44 U/L (ref 9–36)
BASOPHILS NFR BLD AUTO: 1 % (ref 0–3)
BUN SERPL-MCNC: 43 MG/DL (ref 8–23)
BUN/CREAT SERPL: 9
CHLORIDE SERPL-SCNC: 109 MMOL/L (ref 95–108)
CO2 SERPL-SCNC: 20 MMOL/L (ref 22–30)
CREAT SERPL-MCNC: 4.9 MG/DL (ref 0.5–1)
EOSINOPHIL NFR BLD AUTO: 3 % (ref 0–8)
ERYTHROCYTE [DISTWIDTH] IN BLOOD BY AUTOMATED COUNT: 14.4 % (ref 11.5–15.5)
HCT VFR BLD AUTO: 26.5 % (ref 37–47)
HGB BLD-MCNC: 8.9 G/DL (ref 12–16)
IMM GRANULOCYTES NFR BLD: 0.6 % (ref 0–5)
LYMPHOCYTES NFR BLD: 25 % (ref 15–41)
MCH RBC QN AUTO: 28.9 PG  CALC (ref 26–32)
MCHC RBC AUTO-ENTMCNC: 33.6 G/DL CAL (ref 32–36)
MCV RBC AUTO: 86 FL  CALC (ref 80–100)
MONOCYTES NFR BLD AUTO: 9 % (ref 2–13)
NEUTROPHILS # BLD AUTO: 4.1 THOU/UL (ref 2–7.15)
NEUTROPHILS NFR BLD AUTO: 62 % (ref 42–76)
PLATELET # BLD AUTO: 276 THOU/UL (ref 130–400)
POTASSIUM SERPL-SCNC: 3.9 MMOL/L (ref 3.5–5.1)
PROT SERPL-MCNC: 9.2 G/DL (ref 6.3–8.2)
RBC # BLD AUTO: 3.08 MILL/UL (ref 4.2–5.6)
SODIUM SERPL-SCNC: 144 MMOL/L (ref 137–146)

## 2020-11-05 ENCOUNTER — HOSPITAL ENCOUNTER (EMERGENCY)
Dept: HOSPITAL 82 - ED | Age: 65
Discharge: HOME | End: 2020-11-05
Payer: COMMERCIAL

## 2020-11-05 VITALS — BODY MASS INDEX: 26.08 KG/M2 | WEIGHT: 156.53 LBS | HEIGHT: 65 IN

## 2020-11-05 VITALS — SYSTOLIC BLOOD PRESSURE: 183 MMHG | DIASTOLIC BLOOD PRESSURE: 81 MMHG

## 2020-11-05 DIAGNOSIS — E11.22: ICD-10-CM

## 2020-11-05 DIAGNOSIS — F17.200: ICD-10-CM

## 2020-11-05 DIAGNOSIS — Z95.5: ICD-10-CM

## 2020-11-05 DIAGNOSIS — N18.6: ICD-10-CM

## 2020-11-05 DIAGNOSIS — D64.9: Primary | ICD-10-CM

## 2020-11-05 DIAGNOSIS — Z99.2: ICD-10-CM

## 2020-11-05 DIAGNOSIS — I12.0: ICD-10-CM

## 2020-11-05 LAB
BASOPHILS NFR BLD AUTO: 1 % (ref 0–3)
EOSINOPHIL NFR BLD AUTO: 2 % (ref 0–8)
ERYTHROCYTE [DISTWIDTH] IN BLOOD BY AUTOMATED COUNT: 14.8 % (ref 11.5–15.5)
HCT VFR BLD AUTO: 27.4 % (ref 37–47)
HGB BLD-MCNC: 9.1 G/DL (ref 12–16)
IMM GRANULOCYTES NFR BLD: 0.5 % (ref 0–5)
LYMPHOCYTES NFR BLD: 17 % (ref 15–41)
MCH RBC QN AUTO: 29.4 PG  CALC (ref 26–32)
MCHC RBC AUTO-ENTMCNC: 33.2 G/DL CAL (ref 32–36)
MCV RBC AUTO: 88.7 FL  CALC (ref 80–100)
MONOCYTES NFR BLD AUTO: 14 % (ref 2–13)
NEUTROPHILS # BLD AUTO: 4.22 THOU/UL (ref 2–7.15)
NEUTROPHILS NFR BLD AUTO: 65 % (ref 42–76)
PLATELET # BLD AUTO: 271 THOU/UL (ref 130–400)
RBC # BLD AUTO: 3.09 MILL/UL (ref 4.2–5.6)

## 2020-11-08 ENCOUNTER — HOSPITAL ENCOUNTER (EMERGENCY)
Dept: HOSPITAL 82 - ED | Age: 65
Discharge: HOME | End: 2020-11-08
Payer: COMMERCIAL

## 2020-11-08 VITALS — HEIGHT: 65 IN | BODY MASS INDEX: 26.08 KG/M2 | WEIGHT: 156.53 LBS

## 2020-11-08 VITALS — DIASTOLIC BLOOD PRESSURE: 88 MMHG | SYSTOLIC BLOOD PRESSURE: 206 MMHG

## 2020-11-08 DIAGNOSIS — I12.0: Primary | ICD-10-CM

## 2020-11-08 DIAGNOSIS — Z99.2: ICD-10-CM

## 2020-11-08 DIAGNOSIS — N18.6: ICD-10-CM

## 2020-11-08 DIAGNOSIS — Z95.5: ICD-10-CM

## 2020-11-08 DIAGNOSIS — F17.200: ICD-10-CM

## 2020-11-08 DIAGNOSIS — E11.22: ICD-10-CM

## 2021-02-03 ENCOUNTER — HOSPITAL ENCOUNTER (EMERGENCY)
Dept: HOSPITAL 82 - ED | Age: 66
Discharge: HOME | End: 2021-02-03
Payer: COMMERCIAL

## 2021-02-03 VITALS — WEIGHT: 156.53 LBS | BODY MASS INDEX: 26.08 KG/M2 | HEIGHT: 65 IN

## 2021-02-03 VITALS — DIASTOLIC BLOOD PRESSURE: 88 MMHG | SYSTOLIC BLOOD PRESSURE: 183 MMHG

## 2021-02-03 DIAGNOSIS — N39.0: ICD-10-CM

## 2021-02-03 DIAGNOSIS — F32.9: ICD-10-CM

## 2021-02-03 DIAGNOSIS — Z95.5: ICD-10-CM

## 2021-02-03 DIAGNOSIS — E11.9: ICD-10-CM

## 2021-02-03 DIAGNOSIS — U07.1: Primary | ICD-10-CM

## 2021-02-03 DIAGNOSIS — F17.200: ICD-10-CM

## 2021-02-03 DIAGNOSIS — I10: ICD-10-CM

## 2021-02-03 DIAGNOSIS — K74.60: ICD-10-CM

## 2021-02-03 LAB
BILIRUB UR QL STRIP.AUTO: NEGATIVE
COLOR UR AUTO: YELLOW
GLUCOSE UR STRIP.AUTO-MCNC: NEGATIVE MG/DL
HGB UR QL STRIP.AUTO: NEGATIVE
KETONES UR STRIP.AUTO-MCNC: NEGATIVE MG/DL
LEUKOCYTE ESTERASE UR QL STRIP.AUTO: (no result)
NITRITE UR QL STRIP.AUTO: NEGATIVE
PH UR STRIP.AUTO: 8.5 [PH] (ref 4.5–8)
PROT UR QL STRIP.AUTO: >=300 MG/DL
RBC #/AREA URNS HPF: (no result) RBC/HPF (ref 0–5)
SP GR UR STRIP.AUTO: 1.01
SQUAMOUS URNS QL MICRO: (no result) EPI/HPF
UROBILINOGEN UR QL STRIP.AUTO: 0.2 E.U./DL

## 2021-02-04 NOTE — NUR
PT SEEN IN ED YESTERDAY AND TESTED POSITIVE FOR COVID-19. RECEIVED VERBAL
ORDER FROM DR QUEEN TO INITIATE MONOCLONAL ANTIBODY CONSULT. LEFT MESSAGE FOR
PT TO RETURN CALL TO PHARMACY DEPARTMENT.

## 2021-02-28 ENCOUNTER — HOSPITAL ENCOUNTER (EMERGENCY)
Dept: HOSPITAL 82 - ED | Age: 66
LOS: 1 days | Discharge: ANOTHER HOSPITAL | End: 2021-03-01
Payer: COMMERCIAL

## 2021-02-28 VITALS — WEIGHT: 156.53 LBS | HEIGHT: 65 IN | BODY MASS INDEX: 26.08 KG/M2

## 2021-02-28 DIAGNOSIS — K74.60: ICD-10-CM

## 2021-02-28 DIAGNOSIS — J81.1: ICD-10-CM

## 2021-02-28 DIAGNOSIS — I12.0: Primary | ICD-10-CM

## 2021-02-28 DIAGNOSIS — Z95.5: ICD-10-CM

## 2021-02-28 DIAGNOSIS — E11.22: ICD-10-CM

## 2021-02-28 DIAGNOSIS — Z86.16: ICD-10-CM

## 2021-02-28 DIAGNOSIS — Z99.2: ICD-10-CM

## 2021-02-28 DIAGNOSIS — N18.6: ICD-10-CM

## 2021-02-28 DIAGNOSIS — F17.200: ICD-10-CM

## 2021-02-28 DIAGNOSIS — F32.9: ICD-10-CM

## 2021-02-28 DIAGNOSIS — M10.9: ICD-10-CM

## 2021-03-01 VITALS — DIASTOLIC BLOOD PRESSURE: 79 MMHG | SYSTOLIC BLOOD PRESSURE: 175 MMHG

## 2021-03-01 LAB
ALBUMIN SERPL-MCNC: 4.5 G/DL (ref 3.2–5)
ALP SERPL-CCNC: 88 U/L (ref 38–126)
ANION GAP SERPL CALCULATED.3IONS-SCNC: 19 MMOL/L
AST SERPL-CCNC: 27 U/L (ref 9–36)
BASOPHILS NFR BLD AUTO: 1 % (ref 0–3)
BUN SERPL-MCNC: 40 MG/DL (ref 8–23)
BUN/CREAT SERPL: 10
CHLORIDE SERPL-SCNC: 102 MMOL/L (ref 95–108)
CO2 SERPL-SCNC: 23 MMOL/L (ref 22–30)
CREAT SERPL-MCNC: 3.9 MG/DL (ref 0.5–1)
EOSINOPHIL NFR BLD AUTO: 1 % (ref 0–8)
ERYTHROCYTE [DISTWIDTH] IN BLOOD BY AUTOMATED COUNT: 15.4 % (ref 11.5–15.5)
HCT VFR BLD AUTO: 32.8 % (ref 37–47)
HGB BLD-MCNC: 11 G/DL (ref 12–16)
IMM GRANULOCYTES NFR BLD: 0.6 % (ref 0–5)
LYMPHOCYTES NFR BLD: 7 % (ref 15–41)
MCH RBC QN AUTO: 28.1 PG  CALC (ref 26–32)
MCHC RBC AUTO-ENTMCNC: 33.5 G/DL CAL (ref 32–36)
MCV RBC AUTO: 83.9 FL  CALC (ref 80–100)
MONOCYTES NFR BLD AUTO: 6 % (ref 2–13)
MYOGLOBIN SERPL-MCNC: 112 NG/ML (ref 0–62)
NEUTROPHILS # BLD AUTO: 10.56 THOU/UL (ref 2–7.15)
NEUTROPHILS NFR BLD AUTO: 85 % (ref 42–76)
PLATELET # BLD AUTO: 368 THOU/UL (ref 130–400)
POTASSIUM SERPL-SCNC: 3.9 MMOL/L (ref 3.5–5.1)
PROT SERPL-MCNC: 9 G/DL (ref 6.3–8.2)
RBC # BLD AUTO: 3.91 MILL/UL (ref 4.2–5.6)
SODIUM SERPL-SCNC: 139 MMOL/L (ref 137–146)

## 2021-04-10 ENCOUNTER — HOSPITAL ENCOUNTER (EMERGENCY)
Dept: HOSPITAL 82 - ED | Age: 66
Discharge: ANOTHER HOSPITAL | End: 2021-04-10
Payer: COMMERCIAL

## 2021-04-10 VITALS — SYSTOLIC BLOOD PRESSURE: 128 MMHG | DIASTOLIC BLOOD PRESSURE: 54 MMHG

## 2021-04-10 DIAGNOSIS — N39.0: ICD-10-CM

## 2021-04-10 DIAGNOSIS — E11.22: ICD-10-CM

## 2021-04-10 DIAGNOSIS — N18.6: ICD-10-CM

## 2021-04-10 DIAGNOSIS — Z95.5: ICD-10-CM

## 2021-04-10 DIAGNOSIS — Z99.2: ICD-10-CM

## 2021-04-10 DIAGNOSIS — Z20.822: ICD-10-CM

## 2021-04-10 DIAGNOSIS — M10.9: ICD-10-CM

## 2021-04-10 DIAGNOSIS — F17.200: ICD-10-CM

## 2021-04-10 DIAGNOSIS — I12.0: ICD-10-CM
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
## 2021-04-10 DIAGNOSIS — K74.60: ICD-10-CM

## 2021-04-10 DIAGNOSIS — F32.9: ICD-10-CM

## 2021-04-10 DIAGNOSIS — D64.9: Primary | ICD-10-CM

## 2021-04-10 DIAGNOSIS — M54.2: ICD-10-CM

## 2021-04-10 LAB
ALBUMIN SERPL-MCNC: 4 G/DL (ref 3.2–5)
ALP SERPL-CCNC: 59 U/L (ref 38–126)
ANION GAP SERPL CALCULATED.3IONS-SCNC: 15 MMOL/L
AST SERPL-CCNC: 21 U/L (ref 9–36)
BASOPHILS NFR BLD AUTO: 0 % (ref 0–3)
BILIRUB UR QL STRIP.AUTO: NEGATIVE
BUN SERPL-MCNC: 46 MG/DL (ref 8–23)
BUN/CREAT SERPL: 10
CHLORIDE SERPL-SCNC: 98 MMOL/L (ref 95–108)
CO2 SERPL-SCNC: 25 MMOL/L (ref 22–30)
COLOR UR AUTO: YELLOW
CREAT SERPL-MCNC: 4.8 MG/DL (ref 0.5–1)
EOSINOPHIL NFR BLD AUTO: 1 % (ref 0–8)
ERYTHROCYTE [DISTWIDTH] IN BLOOD BY AUTOMATED COUNT: 18.1 % (ref 11.5–15.5)
GLUCOSE UR STRIP.AUTO-MCNC: NEGATIVE MG/DL
HCT VFR BLD AUTO: 16.7 % (ref 37–47)
HGB BLD-MCNC: 5.5 G/DL (ref 12–16)
HGB UR QL STRIP.AUTO: NEGATIVE
IMM GRANULOCYTES NFR BLD: 0.4 % (ref 0–5)
KETONES UR STRIP.AUTO-MCNC: NEGATIVE MG/DL
LEUKOCYTE ESTERASE UR QL STRIP.AUTO: (no result)
LYMPHOCYTES NFR BLD: 13 % (ref 15–41)
MAGNESIUM SERPL-MCNC: 2.4 MG/DL (ref 1.6–2.3)
MCH RBC QN AUTO: 30.1 PG  CALC (ref 26–32)
MCHC RBC AUTO-ENTMCNC: 32.9 G/DL CAL (ref 32–36)
MCV RBC AUTO: 91.3 FL  CALC (ref 80–100)
MONOCYTES NFR BLD AUTO: 8 % (ref 2–13)
NEUTROPHILS # BLD AUTO: 7.23 THOU/UL (ref 2–7.15)
NEUTROPHILS NFR BLD AUTO: 77 % (ref 42–76)
NITRITE UR QL STRIP.AUTO: NEGATIVE
PH UR STRIP.AUTO: 6 [PH] (ref 4.5–8)
PLATELET # BLD AUTO: 259 THOU/UL (ref 130–400)
POTASSIUM SERPL-SCNC: 3.7 MMOL/L (ref 3.5–5.1)
PROT SERPL-MCNC: 7.4 G/DL (ref 6.3–8.2)
PROT UR QL STRIP.AUTO: 100 MG/DL
RBC # BLD AUTO: 1.83 MILL/UL (ref 4.2–5.6)
RBC #/AREA URNS HPF: (no result) RBC/HPF (ref 0–5)
SODIUM SERPL-SCNC: 135 MMOL/L (ref 137–146)
SP GR UR STRIP.AUTO: 1.02
SQUAMOUS URNS QL MICRO: (no result) EPI/HPF
UROBILINOGEN UR QL STRIP.AUTO: 0.2 E.U./DL

## 2021-04-10 PROCEDURE — S0164 INJECTION PANTROPRAZOLE: HCPCS

## 2021-04-10 PROCEDURE — P9016 RBC LEUKOCYTES REDUCED: HCPCS

## 2021-05-31 ENCOUNTER — HOSPITAL ENCOUNTER (EMERGENCY)
Dept: HOSPITAL 82 - ED | Age: 66
Discharge: NURSING, REHAB OR LONG-TERM CARE | End: 2021-05-31
Payer: COMMERCIAL

## 2021-05-31 VITALS — DIASTOLIC BLOOD PRESSURE: 89 MMHG | SYSTOLIC BLOOD PRESSURE: 187 MMHG

## 2021-05-31 VITALS — BODY MASS INDEX: 31.22 KG/M2 | HEIGHT: 65 IN | WEIGHT: 187.39 LBS

## 2021-05-31 DIAGNOSIS — K74.60: ICD-10-CM

## 2021-05-31 DIAGNOSIS — E11.22: ICD-10-CM

## 2021-05-31 DIAGNOSIS — Z99.2: ICD-10-CM

## 2021-05-31 DIAGNOSIS — I12.0: ICD-10-CM

## 2021-05-31 DIAGNOSIS — M10.9: ICD-10-CM

## 2021-05-31 DIAGNOSIS — Z91.15: ICD-10-CM

## 2021-05-31 DIAGNOSIS — E87.70: Primary | ICD-10-CM

## 2021-05-31 DIAGNOSIS — Z95.5: ICD-10-CM

## 2021-05-31 DIAGNOSIS — N18.6: ICD-10-CM

## 2021-05-31 DIAGNOSIS — F32.9: ICD-10-CM

## 2021-05-31 DIAGNOSIS — F17.200: ICD-10-CM

## 2021-05-31 LAB
ALBUMIN SERPL-MCNC: 4.4 G/DL (ref 3.2–5)
ALP SERPL-CCNC: 79 U/L (ref 38–126)
ANION GAP SERPL CALCULATED.3IONS-SCNC: 17 MMOL/L
AST SERPL-CCNC: 44 U/L (ref 9–36)
BASOPHILS NFR BLD AUTO: 1 % (ref 0–3)
BUN SERPL-MCNC: 77 MG/DL (ref 8–23)
BUN/CREAT SERPL: 13
CHLORIDE SERPL-SCNC: 117 MMOL/L (ref 95–108)
CO2 SERPL-SCNC: 11 MMOL/L (ref 22–30)
CREAT SERPL-MCNC: 6.1 MG/DL (ref 0.5–1)
EOSINOPHIL NFR BLD AUTO: 2 % (ref 0–8)
ERYTHROCYTE [DISTWIDTH] IN BLOOD BY AUTOMATED COUNT: 14.5 % (ref 11.5–15.5)
HCT VFR BLD AUTO: 25.7 % (ref 37–47)
HGB BLD-MCNC: 8.5 G/DL (ref 12–16)
IMM GRANULOCYTES NFR BLD: 0.4 % (ref 0–5)
LYMPHOCYTES NFR BLD: 18 % (ref 15–41)
MCH RBC QN AUTO: 29.7 PG  CALC (ref 26–32)
MCHC RBC AUTO-ENTMCNC: 33.1 G/DL CAL (ref 32–36)
MCV RBC AUTO: 89.9 FL  CALC (ref 80–100)
MONOCYTES NFR BLD AUTO: 6 % (ref 2–13)
MYOGLOBIN SERPL-MCNC: 111 NG/ML (ref 0–62)
NEUTROPHILS # BLD AUTO: 4.06 THOU/UL (ref 2–7.15)
NEUTROPHILS NFR BLD AUTO: 73 % (ref 42–76)
PLATELET # BLD AUTO: 298 THOU/UL (ref 130–400)
POTASSIUM SERPL-SCNC: 5.4 MMOL/L (ref 3.5–5.1)
PROT SERPL-MCNC: 9.2 G/DL (ref 6.3–8.2)
RBC # BLD AUTO: 2.86 MILL/UL (ref 4.2–5.6)
SODIUM SERPL-SCNC: 140 MMOL/L (ref 137–146)

## 2021-11-02 ENCOUNTER — HOSPITAL ENCOUNTER (EMERGENCY)
Dept: HOSPITAL 82 - ED | Age: 66
Discharge: LEFT BEFORE DISCHARGE | DRG: 551 | End: 2021-11-02
Payer: COMMERCIAL

## 2021-11-02 VITALS — HEIGHT: 65 IN | BODY MASS INDEX: 30.12 KG/M2 | WEIGHT: 180.78 LBS

## 2021-11-02 VITALS — DIASTOLIC BLOOD PRESSURE: 82 MMHG | SYSTOLIC BLOOD PRESSURE: 161 MMHG

## 2021-11-02 DIAGNOSIS — F17.200: ICD-10-CM

## 2021-11-02 DIAGNOSIS — E11.22: ICD-10-CM

## 2021-11-02 DIAGNOSIS — M54.2: Primary | ICD-10-CM

## 2021-11-02 DIAGNOSIS — Z99.2: ICD-10-CM

## 2021-11-02 DIAGNOSIS — K74.60: ICD-10-CM

## 2021-11-02 DIAGNOSIS — M54.50: ICD-10-CM

## 2021-11-02 DIAGNOSIS — N18.6: ICD-10-CM

## 2021-11-02 DIAGNOSIS — Z95.5: ICD-10-CM
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
## 2021-11-02 DIAGNOSIS — F32.A: ICD-10-CM

## 2021-11-02 DIAGNOSIS — Z91.19: ICD-10-CM

## 2021-11-02 DIAGNOSIS — I12.0: ICD-10-CM

## 2022-05-24 ENCOUNTER — HOSPITAL ENCOUNTER (OUTPATIENT)
Dept: HOSPITAL 82 - ED | Age: 67
Setting detail: OBSERVATION
LOS: 2 days | Discharge: HOME | End: 2022-05-26
Attending: STUDENT IN AN ORGANIZED HEALTH CARE EDUCATION/TRAINING PROGRAM | Admitting: STUDENT IN AN ORGANIZED HEALTH CARE EDUCATION/TRAINING PROGRAM
Payer: MEDICARE

## 2022-05-24 VITALS — DIASTOLIC BLOOD PRESSURE: 64 MMHG | SYSTOLIC BLOOD PRESSURE: 171 MMHG

## 2022-05-24 VITALS — BODY MASS INDEX: 26.08 KG/M2 | WEIGHT: 156.53 LBS | HEIGHT: 65 IN

## 2022-05-24 DIAGNOSIS — N25.81: ICD-10-CM

## 2022-05-24 DIAGNOSIS — R09.02: ICD-10-CM

## 2022-05-24 DIAGNOSIS — I25.10: ICD-10-CM

## 2022-05-24 DIAGNOSIS — F32.A: ICD-10-CM

## 2022-05-24 DIAGNOSIS — B19.20: ICD-10-CM

## 2022-05-24 DIAGNOSIS — E87.5: ICD-10-CM

## 2022-05-24 DIAGNOSIS — F17.200: ICD-10-CM

## 2022-05-24 DIAGNOSIS — K74.60: ICD-10-CM

## 2022-05-24 DIAGNOSIS — N18.6: ICD-10-CM

## 2022-05-24 DIAGNOSIS — D63.1: ICD-10-CM

## 2022-05-24 DIAGNOSIS — Z95.5: ICD-10-CM

## 2022-05-24 DIAGNOSIS — J18.9: ICD-10-CM

## 2022-05-24 DIAGNOSIS — F10.10: ICD-10-CM

## 2022-05-24 DIAGNOSIS — M10.9: ICD-10-CM

## 2022-05-24 DIAGNOSIS — Z20.822: ICD-10-CM

## 2022-05-24 DIAGNOSIS — E11.22: ICD-10-CM

## 2022-05-24 DIAGNOSIS — Z99.2: ICD-10-CM

## 2022-05-24 DIAGNOSIS — I12.0: Primary | ICD-10-CM

## 2022-05-24 LAB
ALBUMIN SERPL-MCNC: 4.4 G/DL (ref 3.2–5)
ALP SERPL-CCNC: 84 U/L (ref 38–126)
ANION GAP SERPL CALCULATED.3IONS-SCNC: 19 MMOL/L
AST SERPL-CCNC: 39 U/L (ref 9–36)
BASOPHILS NFR BLD AUTO: 1 % (ref 0–3)
BILIRUB UR QL STRIP.AUTO: NEGATIVE
BUN SERPL-MCNC: 48 MG/DL (ref 8–23)
BUN/CREAT SERPL: 7
CHLORIDE SERPL-SCNC: 109 MMOL/L (ref 95–108)
CO2 SERPL-SCNC: 18 MMOL/L (ref 22–30)
COLOR UR AUTO: YELLOW
CREAT SERPL-MCNC: 7 MG/DL (ref 0.5–1)
EOSINOPHIL NFR BLD AUTO: 3 % (ref 0–8)
ERYTHROCYTE [DISTWIDTH] IN BLOOD BY AUTOMATED COUNT: 14.6 % (ref 11.5–15.5)
GLUCOSE UR STRIP.AUTO-MCNC: NEGATIVE MG/DL
HCT VFR BLD AUTO: 31.6 % (ref 37–47)
HGB BLD-MCNC: 11 G/DL (ref 12–16)
HGB UR QL STRIP.AUTO: (no result)
IMM GRANULOCYTES NFR BLD: 0.3 % (ref 0–5)
INR PPP: 1 RATIO (ref 0.7–1.3)
KETONES UR STRIP.AUTO-MCNC: NEGATIVE MG/DL
LEUKOCYTE ESTERASE UR QL STRIP.AUTO: (no result)
LYMPHOCYTES NFR BLD: 10 % (ref 15–41)
MCH RBC QN AUTO: 30.9 PG  CALC (ref 26–32)
MCHC RBC AUTO-ENTMCNC: 34.8 G/DL CAL (ref 32–36)
MCV RBC AUTO: 88.8 FL  CALC (ref 80–100)
MONOCYTES NFR BLD AUTO: 6 % (ref 2–13)
NEUTROPHILS # BLD AUTO: 7.55 THOU/UL (ref 2–7.15)
NEUTROPHILS NFR BLD AUTO: 81 % (ref 42–76)
NITRITE UR QL STRIP.AUTO: NEGATIVE
PH UR STRIP.AUTO: 8.5 [PH] (ref 4.5–8)
PLATELET # BLD AUTO: 270 THOU/UL (ref 130–400)
POTASSIUM SERPL-SCNC: 5.3 MMOL/L (ref 3.5–5.1)
PROT SERPL-MCNC: 8.9 G/DL (ref 6.3–8.2)
PROT UR QL STRIP.AUTO: 100 MG/DL
PROTHROMBIN TIME: 10.7 SECONDS (ref 9–12.5)
RBC # BLD AUTO: 3.56 MILL/UL (ref 4.2–5.6)
RBC #/AREA URNS HPF: (no result) RBC/HPF (ref 0–5)
SODIUM SERPL-SCNC: 141 MMOL/L (ref 137–146)
SP GR UR STRIP.AUTO: 1.02
SQUAMOUS URNS QL MICRO: (no result) EPI/HPF
UROBILINOGEN UR QL STRIP.AUTO: 0.2 E.U./DL

## 2022-05-25 VITALS — DIASTOLIC BLOOD PRESSURE: 71 MMHG | SYSTOLIC BLOOD PRESSURE: 207 MMHG

## 2022-05-25 VITALS — SYSTOLIC BLOOD PRESSURE: 148 MMHG | DIASTOLIC BLOOD PRESSURE: 54 MMHG

## 2022-05-25 VITALS — DIASTOLIC BLOOD PRESSURE: 60 MMHG | SYSTOLIC BLOOD PRESSURE: 132 MMHG

## 2022-05-25 VITALS — DIASTOLIC BLOOD PRESSURE: 49 MMHG | SYSTOLIC BLOOD PRESSURE: 108 MMHG

## 2022-05-25 VITALS — SYSTOLIC BLOOD PRESSURE: 140 MMHG | DIASTOLIC BLOOD PRESSURE: 66 MMHG

## 2022-05-25 VITALS — DIASTOLIC BLOOD PRESSURE: 66 MMHG | SYSTOLIC BLOOD PRESSURE: 140 MMHG

## 2022-05-25 VITALS — SYSTOLIC BLOOD PRESSURE: 156 MMHG | DIASTOLIC BLOOD PRESSURE: 55 MMHG

## 2022-05-25 VITALS — SYSTOLIC BLOOD PRESSURE: 132 MMHG | DIASTOLIC BLOOD PRESSURE: 60 MMHG

## 2022-05-25 LAB
ANION GAP SERPL CALCULATED.3IONS-SCNC: 18 MMOL/L
BUN SERPL-MCNC: 48 MG/DL (ref 8–23)
BUN/CREAT SERPL: 7
CHLORIDE SERPL-SCNC: 109 MMOL/L (ref 95–108)
CO2 SERPL-SCNC: 19 MMOL/L (ref 22–30)
CREAT SERPL-MCNC: 7 MG/DL (ref 0.5–1)
ERYTHROCYTE [DISTWIDTH] IN BLOOD BY AUTOMATED COUNT: 14.4 % (ref 11.5–15.5)
HCT VFR BLD AUTO: 27.7 % (ref 37–47)
HGB BLD-MCNC: 9.5 G/DL (ref 12–16)
MAGNESIUM SERPL-MCNC: 2.4 MG/DL (ref 1.6–2.3)
MCH RBC QN AUTO: 30.6 PG  CALC (ref 26–32)
MCHC RBC AUTO-ENTMCNC: 34.3 G/DL CAL (ref 32–36)
MCV RBC AUTO: 89.4 FL  CALC (ref 80–100)
PLATELET # BLD AUTO: 234 THOU/UL (ref 130–400)
POTASSIUM SERPL-SCNC: 4.7 MMOL/L (ref 3.5–5.1)
RBC # BLD AUTO: 3.1 MILL/UL (ref 4.2–5.6)
SODIUM SERPL-SCNC: 142 MMOL/L (ref 137–146)

## 2022-05-25 PROCEDURE — 5A1D70Z PERFORMANCE OF URINARY FILTRATION, INTERMITTENT, LESS THAN 6 HOURS PER DAY: ICD-10-PCS | Performed by: INTERNAL MEDICINE

## 2022-05-26 VITALS — DIASTOLIC BLOOD PRESSURE: 55 MMHG | SYSTOLIC BLOOD PRESSURE: 154 MMHG

## 2022-05-26 VITALS — DIASTOLIC BLOOD PRESSURE: 60 MMHG | SYSTOLIC BLOOD PRESSURE: 167 MMHG

## 2022-05-26 VITALS — SYSTOLIC BLOOD PRESSURE: 122 MMHG | DIASTOLIC BLOOD PRESSURE: 64 MMHG

## 2022-05-26 LAB
ALBUMIN SERPL-MCNC: 3.7 G/DL (ref 3.2–5)
ALP SERPL-CCNC: 74 U/L (ref 38–126)
ANION GAP SERPL CALCULATED.3IONS-SCNC: 15 MMOL/L
AST SERPL-CCNC: 16 U/L (ref 9–36)
BASOPHILS NFR BLD AUTO: 1 % (ref 0–3)
BUN SERPL-MCNC: 31 MG/DL (ref 8–23)
BUN/CREAT SERPL: 6
CHLORIDE SERPL-SCNC: 105 MMOL/L (ref 95–108)
CO2 SERPL-SCNC: 25 MMOL/L (ref 22–30)
CREAT SERPL-MCNC: 5 MG/DL (ref 0.5–1)
EOSINOPHIL NFR BLD AUTO: 7 % (ref 0–8)
ERYTHROCYTE [DISTWIDTH] IN BLOOD BY AUTOMATED COUNT: 14.2 % (ref 11.5–15.5)
HCT VFR BLD AUTO: 27.6 % (ref 37–47)
HGB BLD-MCNC: 9.5 G/DL (ref 12–16)
IMM GRANULOCYTES NFR BLD: 0.2 % (ref 0–5)
LYMPHOCYTES NFR BLD: 29 % (ref 15–41)
MAGNESIUM SERPL-MCNC: 2.1 MG/DL (ref 1.6–2.3)
MCH RBC QN AUTO: 30.4 PG  CALC (ref 26–32)
MCHC RBC AUTO-ENTMCNC: 34.4 G/DL CAL (ref 32–36)
MCV RBC AUTO: 88.5 FL  CALC (ref 80–100)
MONOCYTES NFR BLD AUTO: 7 % (ref 2–13)
NEUTROPHILS # BLD AUTO: 2.86 THOU/UL (ref 2–7.15)
NEUTROPHILS NFR BLD AUTO: 56 % (ref 42–76)
PLATELET # BLD AUTO: 226 THOU/UL (ref 130–400)
POTASSIUM SERPL-SCNC: 3.9 MMOL/L (ref 3.5–5.1)
PROT SERPL-MCNC: 7.3 G/DL (ref 6.3–8.2)
RBC # BLD AUTO: 3.12 MILL/UL (ref 4.2–5.6)
SODIUM SERPL-SCNC: 141 MMOL/L (ref 137–146)

## 2022-08-08 ENCOUNTER — HOSPITAL ENCOUNTER (INPATIENT)
Dept: HOSPITAL 82 - ED | Age: 67
LOS: 1 days | Discharge: HOME | DRG: 291 | End: 2022-08-09
Attending: INTERNAL MEDICINE | Admitting: INTERNAL MEDICINE
Payer: MEDICARE

## 2022-08-08 VITALS — BODY MASS INDEX: 26.58 KG/M2 | WEIGHT: 165.37 LBS | HEIGHT: 66 IN

## 2022-08-08 DIAGNOSIS — Z20.822: ICD-10-CM

## 2022-08-08 DIAGNOSIS — Z99.2: ICD-10-CM

## 2022-08-08 DIAGNOSIS — B19.20: ICD-10-CM

## 2022-08-08 DIAGNOSIS — I13.2: Primary | ICD-10-CM

## 2022-08-08 DIAGNOSIS — N25.81: ICD-10-CM

## 2022-08-08 DIAGNOSIS — Z95.828: ICD-10-CM

## 2022-08-08 DIAGNOSIS — J44.1: ICD-10-CM

## 2022-08-08 DIAGNOSIS — E87.5: ICD-10-CM

## 2022-08-08 DIAGNOSIS — I50.9: ICD-10-CM

## 2022-08-08 DIAGNOSIS — N18.6: ICD-10-CM

## 2022-08-08 DIAGNOSIS — J96.01: ICD-10-CM

## 2022-08-08 DIAGNOSIS — D63.1: ICD-10-CM

## 2022-08-08 DIAGNOSIS — Z95.5: ICD-10-CM

## 2022-08-08 DIAGNOSIS — E11.22: ICD-10-CM

## 2022-08-09 VITALS — SYSTOLIC BLOOD PRESSURE: 170 MMHG | DIASTOLIC BLOOD PRESSURE: 67 MMHG

## 2022-08-09 VITALS — SYSTOLIC BLOOD PRESSURE: 165 MMHG | DIASTOLIC BLOOD PRESSURE: 66 MMHG

## 2022-08-09 VITALS — DIASTOLIC BLOOD PRESSURE: 63 MMHG | SYSTOLIC BLOOD PRESSURE: 155 MMHG

## 2022-08-09 VITALS — DIASTOLIC BLOOD PRESSURE: 69 MMHG | SYSTOLIC BLOOD PRESSURE: 169 MMHG

## 2022-08-09 VITALS — DIASTOLIC BLOOD PRESSURE: 69 MMHG | SYSTOLIC BLOOD PRESSURE: 147 MMHG

## 2022-08-09 VITALS — DIASTOLIC BLOOD PRESSURE: 61 MMHG | SYSTOLIC BLOOD PRESSURE: 143 MMHG

## 2022-08-09 VITALS — SYSTOLIC BLOOD PRESSURE: 171 MMHG | DIASTOLIC BLOOD PRESSURE: 65 MMHG

## 2022-08-09 VITALS — DIASTOLIC BLOOD PRESSURE: 69 MMHG | SYSTOLIC BLOOD PRESSURE: 170 MMHG

## 2022-08-09 VITALS — SYSTOLIC BLOOD PRESSURE: 166 MMHG | DIASTOLIC BLOOD PRESSURE: 72 MMHG

## 2022-08-09 VITALS — DIASTOLIC BLOOD PRESSURE: 72 MMHG | SYSTOLIC BLOOD PRESSURE: 180 MMHG

## 2022-08-09 VITALS — SYSTOLIC BLOOD PRESSURE: 162 MMHG | DIASTOLIC BLOOD PRESSURE: 67 MMHG

## 2022-08-09 VITALS — DIASTOLIC BLOOD PRESSURE: 121 MMHG | SYSTOLIC BLOOD PRESSURE: 151 MMHG

## 2022-08-09 VITALS — DIASTOLIC BLOOD PRESSURE: 66 MMHG | SYSTOLIC BLOOD PRESSURE: 157 MMHG

## 2022-08-09 VITALS — DIASTOLIC BLOOD PRESSURE: 61 MMHG | SYSTOLIC BLOOD PRESSURE: 148 MMHG

## 2022-08-09 VITALS — DIASTOLIC BLOOD PRESSURE: 64 MMHG | SYSTOLIC BLOOD PRESSURE: 161 MMHG

## 2022-08-09 VITALS — DIASTOLIC BLOOD PRESSURE: 60 MMHG | SYSTOLIC BLOOD PRESSURE: 145 MMHG

## 2022-08-09 VITALS — SYSTOLIC BLOOD PRESSURE: 171 MMHG | DIASTOLIC BLOOD PRESSURE: 68 MMHG

## 2022-08-09 LAB
ALBUMIN SERPL-MCNC: 4.3 G/DL (ref 3.2–5)
ALP SERPL-CCNC: 108 U/L (ref 38–126)
ANION GAP SERPL CALCULATED.3IONS-SCNC: 17 MMOL/L
AST SERPL-CCNC: 17 U/L (ref 9–36)
BACTERIA #/AREA URNS HPF: (no result) HPF
BASOPHILS NFR BLD AUTO: 1 % (ref 0–3)
BILIRUB UR QL STRIP.AUTO: NEGATIVE
BUN SERPL-MCNC: 44 MG/DL (ref 8–23)
BUN/CREAT SERPL: 7
CHLORIDE SERPL-SCNC: 106 MMOL/L (ref 95–108)
CO2 SERPL-SCNC: 23 MMOL/L (ref 22–30)
COLOR UR AUTO: YELLOW
CREAT SERPL-MCNC: 6.4 MG/DL (ref 0.5–1)
EOSINOPHIL NFR BLD AUTO: 3 % (ref 0–8)
EPI CELLS URNS QL MICRO: (no result) EPI/HPF
ERYTHROCYTE [DISTWIDTH] IN BLOOD BY AUTOMATED COUNT: 14.7 % (ref 11.5–15.5)
GLUCOSE UR STRIP.AUTO-MCNC: NEGATIVE MG/DL
HCT VFR BLD AUTO: 24.6 % (ref 37–47)
HGB BLD-MCNC: 8.4 G/DL (ref 12–16)
HGB UR QL STRIP.AUTO: NEGATIVE
IMM GRANULOCYTES NFR BLD: 0.5 % (ref 0–5)
KETONES UR STRIP.AUTO-MCNC: NEGATIVE MG/DL
LEUKOCYTE ESTERASE UR QL STRIP.AUTO: (no result)
LYMPHOCYTES NFR BLD: 13 % (ref 15–41)
MCH RBC QN AUTO: 30.7 PG  CALC (ref 26–32)
MCHC RBC AUTO-ENTMCNC: 34.1 G/DL CAL (ref 32–36)
MCV RBC AUTO: 89.8 FL  CALC (ref 80–100)
MONOCYTES NFR BLD AUTO: 6 % (ref 2–13)
MYOGLOBIN SERPL-MCNC: 144 NG/ML (ref 0–62)
NEUTROPHILS # BLD AUTO: 5.94 THOU/UL (ref 2–7.15)
NEUTROPHILS NFR BLD AUTO: 76 % (ref 42–76)
NITRITE UR QL STRIP.AUTO: NEGATIVE
PH UR STRIP.AUTO: 8 [PH] (ref 4.5–8)
PLATELET # BLD AUTO: 238 THOU/UL (ref 130–400)
POTASSIUM SERPL-SCNC: 3.6 MMOL/L (ref 3.5–5.1)
PROT SERPL-MCNC: 7.9 G/DL (ref 6.3–8.2)
PROT UR QL STRIP.AUTO: 100 MG/DL
RBC # BLD AUTO: 2.74 MILL/UL (ref 4.2–5.6)
RBC #/AREA URNS HPF: (no result) RBC/HPF (ref 0–5)
SODIUM SERPL-SCNC: 142 MMOL/L (ref 137–146)
SP GR UR STRIP.AUTO: 1.01
UROBILINOGEN UR QL STRIP.AUTO: 0.2 E.U./DL
WBC #/AREA URNS HPF: (no result) WBC/HPF (ref 0–5)

## 2022-08-09 PROCEDURE — 5A1D70Z PERFORMANCE OF URINARY FILTRATION, INTERMITTENT, LESS THAN 6 HOURS PER DAY: ICD-10-PCS | Performed by: INTERNAL MEDICINE

## 2022-08-09 PROCEDURE — 5A09357 ASSISTANCE WITH RESPIRATORY VENTILATION, LESS THAN 24 CONSECUTIVE HOURS, CONTINUOUS POSITIVE AIRWAY PRESSURE: ICD-10-PCS | Performed by: EMERGENCY MEDICINE

## 2022-08-09 NOTE — NUR
PT BEING TO SLOWLY DSAT IN MID 80'S ON 4L, PT CHANGED INTO GOWN, ASSISTED TO
THE BEDPAN AND URINE COLLECTED. REPORTED TO PHYSICIAN CHANGE IN 02 STATUS,
PHYSICIAN ORDERED TO BE PLACED ON BIPAP. RESPIRATORY CONTACTED AND COMPLETED.
PT CURRENTLY ON 50% O2 ON BIPAP IN UPPER 90S. WILL CONTINUE TO MONITOR.

## 2022-08-09 NOTE — NUR
Patient to transfer to med/surg room 268, hand off report given to GEOVANNY Pedroza,
patient awake and alert, no c/o pain or discomfort, no s/s of idstress noted,
patient refuses to wear bipap or nasal canula, again states that she is just
here for dialysis and then she is going home, patient had breakfast meal,
consumed 100%, patient taken to med/surg  by wheel chair on portable tele.

## 2022-08-09 NOTE — NUR
PT BED ASSIGNMENT AVAILABLE, WAITING FOR NOTIFICATION FROM FLOOR TO HAVE BED
CLEANED. NURSING SUPERVISOR TO COME DOWN FOR TRANSFER TO FLOOR.

## 2022-08-09 NOTE — NUR
Patient states she is just here for dialysis. Patient refusing bipap and nasal
canula, on room air only at this time, patient demanding breakfast, dialysis
and then discharge.

## 2022-08-09 NOTE — NUR
PT ARRIVED VIA EMS FROM HOME WITHCOPD EXACERBATION. PT WAS PLACED ON NC AT 2L,
REMAINED STABLE. ASSISTED PT WITH COMFORT TECHNIQUES, LABS DRAWN. WILL AWAIT
RESULTS. WILL CONTINUE TO MONITOR.

## 2022-08-09 NOTE — NUR
DIALYSIS:
PT RECEIVED ON WHEELCHAIR WITH RN, PT STABLE, ALERT, NO C/O'S, LUNGS CLEAR, NO
EDEMA NOTED, HD TX CONSENT SIGNED, PT WITH AVG ON HER RT UPPER ARM WITH THRILL
AND BRUIT, PT WANTS TO TAKE 2.5 HOURS, DR. MCCAIN NOTIFIED, PT EDUCATED ABOUT
PROCEDURE, PT CANNULATED WITH 15G NEEDLES W/O PROBLEMS, TX STARTED UNDER
ASEPTIC, TARGET SET TO 2 LTS AS CHARITY, WILL MONITOR HER BP, PT DENIES ANY PAIN.
BP-187/73, P-80, R-18, TEMP-97.7
 
[id number]
[id number]
CARTRIDGE LOT-[id number]
PH-7.2
COND. ACT.-14.2
COND. EXP.-14.0
WATER TEMP-85.6

## 2022-08-09 NOTE — NUR
PT RECEIVED TO MED SURGE UNIT VIA WC. ORIENTATED PT TO ROOM. ASSESSMENT
ALLOWED. IV 22G LH FLUSHED. TELE MONITOR IN PLACE, CONITNOUS MONTORING PER ED.
STATES NO NEED AT THIS TIME. FALL/SAFTEY PRECAUTION IN PLACE. CALL LIGHT
WITHIN REACH

## 2022-08-09 NOTE — NUR
DIALYSIS POST:
BLOOD RETURNED UNDER ASEPTIC TECHNIQUES, . PT WANTED TO COME OFF 10 MINS LEFT,
PT WITH CRAMPS IN THE END OF TX, 1.7 LTS REMOVED, PT STABLE, ALERT, NO C/O'S,
DENIES ANY PAIN, VSS, BP-183/74, P-76, R-19, TEMP-97.5, LUNGS CLEAR, PT
TRANFERED BACK TO HER ROOM ON WHEELCHAIR. REPORT GIVEN TO HER PRIMARY RN.

## 2023-03-06 ENCOUNTER — HOSPITAL ENCOUNTER (EMERGENCY)
Dept: HOSPITAL 82 - ED | Age: 68
Discharge: HOME | End: 2023-03-06
Payer: MEDICARE

## 2023-03-06 VITALS — SYSTOLIC BLOOD PRESSURE: 203 MMHG | DIASTOLIC BLOOD PRESSURE: 81 MMHG

## 2023-03-06 VITALS — SYSTOLIC BLOOD PRESSURE: 181 MMHG | DIASTOLIC BLOOD PRESSURE: 72 MMHG

## 2023-03-06 VITALS — BODY MASS INDEX: 24.91 KG/M2 | HEIGHT: 66 IN | WEIGHT: 154.98 LBS

## 2023-03-06 VITALS — SYSTOLIC BLOOD PRESSURE: 181 MMHG | DIASTOLIC BLOOD PRESSURE: 71 MMHG

## 2023-03-06 VITALS — SYSTOLIC BLOOD PRESSURE: 174 MMHG | DIASTOLIC BLOOD PRESSURE: 73 MMHG

## 2023-03-06 VITALS — SYSTOLIC BLOOD PRESSURE: 182 MMHG | DIASTOLIC BLOOD PRESSURE: 78 MMHG

## 2023-03-06 DIAGNOSIS — N18.6: ICD-10-CM

## 2023-03-06 DIAGNOSIS — S90.02XA: Primary | ICD-10-CM

## 2023-03-06 DIAGNOSIS — F32.A: ICD-10-CM

## 2023-03-06 DIAGNOSIS — I12.0: ICD-10-CM

## 2023-03-06 DIAGNOSIS — M10.9: ICD-10-CM

## 2023-03-06 DIAGNOSIS — Z99.2: ICD-10-CM
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
## 2023-03-06 DIAGNOSIS — Z95.5: ICD-10-CM

## 2023-03-06 DIAGNOSIS — F17.210: ICD-10-CM

## 2023-03-06 DIAGNOSIS — E11.22: ICD-10-CM

## 2023-03-06 DIAGNOSIS — Z95.828: ICD-10-CM

## 2023-04-20 ENCOUNTER — HOSPITAL ENCOUNTER (OUTPATIENT)
Dept: HOSPITAL 82 - ED | Age: 68
Setting detail: OBSERVATION
LOS: 1 days | Discharge: PSYCHIATRIC OR SUBSTANCE USE FACILITY | End: 2023-04-21
Attending: INTERNAL MEDICINE | Admitting: INTERNAL MEDICINE
Payer: MEDICARE

## 2023-04-20 VITALS — SYSTOLIC BLOOD PRESSURE: 170 MMHG | DIASTOLIC BLOOD PRESSURE: 78 MMHG

## 2023-04-20 VITALS — DIASTOLIC BLOOD PRESSURE: 63 MMHG | SYSTOLIC BLOOD PRESSURE: 150 MMHG

## 2023-04-20 VITALS — DIASTOLIC BLOOD PRESSURE: 81 MMHG | SYSTOLIC BLOOD PRESSURE: 179 MMHG

## 2023-04-20 VITALS — DIASTOLIC BLOOD PRESSURE: 87 MMHG | SYSTOLIC BLOOD PRESSURE: 212 MMHG

## 2023-04-20 VITALS — DIASTOLIC BLOOD PRESSURE: 82 MMHG | SYSTOLIC BLOOD PRESSURE: 194 MMHG

## 2023-04-20 VITALS — SYSTOLIC BLOOD PRESSURE: 200 MMHG | DIASTOLIC BLOOD PRESSURE: 77 MMHG

## 2023-04-20 VITALS — DIASTOLIC BLOOD PRESSURE: 59 MMHG | SYSTOLIC BLOOD PRESSURE: 146 MMHG

## 2023-04-20 VITALS — DIASTOLIC BLOOD PRESSURE: 80 MMHG | SYSTOLIC BLOOD PRESSURE: 216 MMHG

## 2023-04-20 VITALS — DIASTOLIC BLOOD PRESSURE: 79 MMHG | SYSTOLIC BLOOD PRESSURE: 194 MMHG

## 2023-04-20 VITALS — DIASTOLIC BLOOD PRESSURE: 65 MMHG | SYSTOLIC BLOOD PRESSURE: 154 MMHG

## 2023-04-20 VITALS — DIASTOLIC BLOOD PRESSURE: 82 MMHG | SYSTOLIC BLOOD PRESSURE: 201 MMHG

## 2023-04-20 VITALS — DIASTOLIC BLOOD PRESSURE: 82 MMHG | SYSTOLIC BLOOD PRESSURE: 207 MMHG

## 2023-04-20 VITALS — SYSTOLIC BLOOD PRESSURE: 154 MMHG | DIASTOLIC BLOOD PRESSURE: 70 MMHG

## 2023-04-20 VITALS — SYSTOLIC BLOOD PRESSURE: 163 MMHG | DIASTOLIC BLOOD PRESSURE: 69 MMHG

## 2023-04-20 VITALS — SYSTOLIC BLOOD PRESSURE: 218 MMHG | DIASTOLIC BLOOD PRESSURE: 87 MMHG

## 2023-04-20 VITALS — SYSTOLIC BLOOD PRESSURE: 182 MMHG | DIASTOLIC BLOOD PRESSURE: 82 MMHG

## 2023-04-20 VITALS — SYSTOLIC BLOOD PRESSURE: 169 MMHG | DIASTOLIC BLOOD PRESSURE: 66 MMHG

## 2023-04-20 VITALS — DIASTOLIC BLOOD PRESSURE: 60 MMHG | SYSTOLIC BLOOD PRESSURE: 158 MMHG

## 2023-04-20 VITALS — DIASTOLIC BLOOD PRESSURE: 117 MMHG | SYSTOLIC BLOOD PRESSURE: 157 MMHG

## 2023-04-20 VITALS — WEIGHT: 141.1 LBS | HEIGHT: 66 IN | BODY MASS INDEX: 22.68 KG/M2

## 2023-04-20 VITALS — SYSTOLIC BLOOD PRESSURE: 206 MMHG | DIASTOLIC BLOOD PRESSURE: 83 MMHG

## 2023-04-20 VITALS — DIASTOLIC BLOOD PRESSURE: 69 MMHG | SYSTOLIC BLOOD PRESSURE: 177 MMHG

## 2023-04-20 VITALS — DIASTOLIC BLOOD PRESSURE: 80 MMHG | SYSTOLIC BLOOD PRESSURE: 173 MMHG

## 2023-04-20 VITALS — DIASTOLIC BLOOD PRESSURE: 80 MMHG | SYSTOLIC BLOOD PRESSURE: 166 MMHG

## 2023-04-20 VITALS — SYSTOLIC BLOOD PRESSURE: 129 MMHG | DIASTOLIC BLOOD PRESSURE: 51 MMHG

## 2023-04-20 VITALS — SYSTOLIC BLOOD PRESSURE: 204 MMHG | DIASTOLIC BLOOD PRESSURE: 87 MMHG

## 2023-04-20 VITALS — DIASTOLIC BLOOD PRESSURE: 80 MMHG | SYSTOLIC BLOOD PRESSURE: 204 MMHG

## 2023-04-20 VITALS — SYSTOLIC BLOOD PRESSURE: 175 MMHG | DIASTOLIC BLOOD PRESSURE: 84 MMHG

## 2023-04-20 VITALS — SYSTOLIC BLOOD PRESSURE: 194 MMHG | DIASTOLIC BLOOD PRESSURE: 79 MMHG

## 2023-04-20 VITALS — DIASTOLIC BLOOD PRESSURE: 84 MMHG | SYSTOLIC BLOOD PRESSURE: 168 MMHG

## 2023-04-20 VITALS — SYSTOLIC BLOOD PRESSURE: 162 MMHG | DIASTOLIC BLOOD PRESSURE: 73 MMHG

## 2023-04-20 VITALS — DIASTOLIC BLOOD PRESSURE: 88 MMHG | SYSTOLIC BLOOD PRESSURE: 197 MMHG

## 2023-04-20 VITALS — SYSTOLIC BLOOD PRESSURE: 195 MMHG | DIASTOLIC BLOOD PRESSURE: 79 MMHG

## 2023-04-20 VITALS — SYSTOLIC BLOOD PRESSURE: 182 MMHG | DIASTOLIC BLOOD PRESSURE: 80 MMHG

## 2023-04-20 VITALS — SYSTOLIC BLOOD PRESSURE: 162 MMHG | DIASTOLIC BLOOD PRESSURE: 63 MMHG

## 2023-04-20 VITALS — DIASTOLIC BLOOD PRESSURE: 81 MMHG | SYSTOLIC BLOOD PRESSURE: 177 MMHG

## 2023-04-20 VITALS — SYSTOLIC BLOOD PRESSURE: 207 MMHG | DIASTOLIC BLOOD PRESSURE: 84 MMHG

## 2023-04-20 VITALS — DIASTOLIC BLOOD PRESSURE: 83 MMHG | SYSTOLIC BLOOD PRESSURE: 184 MMHG

## 2023-04-20 VITALS — DIASTOLIC BLOOD PRESSURE: 87 MMHG | SYSTOLIC BLOOD PRESSURE: 202 MMHG

## 2023-04-20 VITALS — DIASTOLIC BLOOD PRESSURE: 77 MMHG | SYSTOLIC BLOOD PRESSURE: 192 MMHG

## 2023-04-20 VITALS — DIASTOLIC BLOOD PRESSURE: 83 MMHG | SYSTOLIC BLOOD PRESSURE: 218 MMHG

## 2023-04-20 VITALS — SYSTOLIC BLOOD PRESSURE: 161 MMHG | DIASTOLIC BLOOD PRESSURE: 65 MMHG

## 2023-04-20 VITALS — SYSTOLIC BLOOD PRESSURE: 157 MMHG | DIASTOLIC BLOOD PRESSURE: 75 MMHG

## 2023-04-20 VITALS — DIASTOLIC BLOOD PRESSURE: 86 MMHG | SYSTOLIC BLOOD PRESSURE: 209 MMHG

## 2023-04-20 VITALS — DIASTOLIC BLOOD PRESSURE: 64 MMHG | SYSTOLIC BLOOD PRESSURE: 172 MMHG

## 2023-04-20 VITALS — DIASTOLIC BLOOD PRESSURE: 79 MMHG | SYSTOLIC BLOOD PRESSURE: 197 MMHG

## 2023-04-20 VITALS — SYSTOLIC BLOOD PRESSURE: 198 MMHG | DIASTOLIC BLOOD PRESSURE: 87 MMHG

## 2023-04-20 VITALS — SYSTOLIC BLOOD PRESSURE: 173 MMHG | DIASTOLIC BLOOD PRESSURE: 68 MMHG

## 2023-04-20 VITALS — SYSTOLIC BLOOD PRESSURE: 191 MMHG | DIASTOLIC BLOOD PRESSURE: 80 MMHG

## 2023-04-20 VITALS — DIASTOLIC BLOOD PRESSURE: 63 MMHG | SYSTOLIC BLOOD PRESSURE: 175 MMHG

## 2023-04-20 VITALS — SYSTOLIC BLOOD PRESSURE: 203 MMHG | DIASTOLIC BLOOD PRESSURE: 84 MMHG

## 2023-04-20 VITALS — SYSTOLIC BLOOD PRESSURE: 120 MMHG | DIASTOLIC BLOOD PRESSURE: 50 MMHG

## 2023-04-20 VITALS — DIASTOLIC BLOOD PRESSURE: 80 MMHG | SYSTOLIC BLOOD PRESSURE: 195 MMHG

## 2023-04-20 VITALS — SYSTOLIC BLOOD PRESSURE: 190 MMHG | DIASTOLIC BLOOD PRESSURE: 82 MMHG

## 2023-04-20 VITALS — DIASTOLIC BLOOD PRESSURE: 79 MMHG | SYSTOLIC BLOOD PRESSURE: 198 MMHG

## 2023-04-20 VITALS — DIASTOLIC BLOOD PRESSURE: 89 MMHG | SYSTOLIC BLOOD PRESSURE: 180 MMHG

## 2023-04-20 VITALS — DIASTOLIC BLOOD PRESSURE: 70 MMHG | SYSTOLIC BLOOD PRESSURE: 171 MMHG

## 2023-04-20 VITALS — DIASTOLIC BLOOD PRESSURE: 72 MMHG | SYSTOLIC BLOOD PRESSURE: 166 MMHG

## 2023-04-20 VITALS — SYSTOLIC BLOOD PRESSURE: 179 MMHG | DIASTOLIC BLOOD PRESSURE: 78 MMHG

## 2023-04-20 VITALS — DIASTOLIC BLOOD PRESSURE: 79 MMHG | SYSTOLIC BLOOD PRESSURE: 172 MMHG

## 2023-04-20 VITALS — SYSTOLIC BLOOD PRESSURE: 128 MMHG | DIASTOLIC BLOOD PRESSURE: 49 MMHG

## 2023-04-20 VITALS — DIASTOLIC BLOOD PRESSURE: 66 MMHG | SYSTOLIC BLOOD PRESSURE: 179 MMHG

## 2023-04-20 VITALS — SYSTOLIC BLOOD PRESSURE: 163 MMHG | DIASTOLIC BLOOD PRESSURE: 75 MMHG

## 2023-04-20 VITALS — DIASTOLIC BLOOD PRESSURE: 101 MMHG | SYSTOLIC BLOOD PRESSURE: 187 MMHG

## 2023-04-20 VITALS — DIASTOLIC BLOOD PRESSURE: 70 MMHG | SYSTOLIC BLOOD PRESSURE: 161 MMHG

## 2023-04-20 VITALS — SYSTOLIC BLOOD PRESSURE: 171 MMHG | DIASTOLIC BLOOD PRESSURE: 71 MMHG

## 2023-04-20 VITALS — DIASTOLIC BLOOD PRESSURE: 83 MMHG | SYSTOLIC BLOOD PRESSURE: 192 MMHG

## 2023-04-20 VITALS — DIASTOLIC BLOOD PRESSURE: 61 MMHG | SYSTOLIC BLOOD PRESSURE: 145 MMHG

## 2023-04-20 VITALS — SYSTOLIC BLOOD PRESSURE: 177 MMHG | DIASTOLIC BLOOD PRESSURE: 78 MMHG

## 2023-04-20 VITALS — SYSTOLIC BLOOD PRESSURE: 186 MMHG | DIASTOLIC BLOOD PRESSURE: 85 MMHG

## 2023-04-20 VITALS — SYSTOLIC BLOOD PRESSURE: 178 MMHG | DIASTOLIC BLOOD PRESSURE: 74 MMHG

## 2023-04-20 VITALS — SYSTOLIC BLOOD PRESSURE: 193 MMHG | DIASTOLIC BLOOD PRESSURE: 78 MMHG

## 2023-04-20 VITALS — SYSTOLIC BLOOD PRESSURE: 164 MMHG | DIASTOLIC BLOOD PRESSURE: 77 MMHG

## 2023-04-20 VITALS — SYSTOLIC BLOOD PRESSURE: 174 MMHG | DIASTOLIC BLOOD PRESSURE: 70 MMHG

## 2023-04-20 VITALS — DIASTOLIC BLOOD PRESSURE: 79 MMHG | SYSTOLIC BLOOD PRESSURE: 164 MMHG

## 2023-04-20 VITALS — SYSTOLIC BLOOD PRESSURE: 207 MMHG | DIASTOLIC BLOOD PRESSURE: 83 MMHG

## 2023-04-20 VITALS — SYSTOLIC BLOOD PRESSURE: 177 MMHG | DIASTOLIC BLOOD PRESSURE: 75 MMHG

## 2023-04-20 VITALS — SYSTOLIC BLOOD PRESSURE: 169 MMHG | DIASTOLIC BLOOD PRESSURE: 77 MMHG

## 2023-04-20 VITALS — SYSTOLIC BLOOD PRESSURE: 159 MMHG | DIASTOLIC BLOOD PRESSURE: 74 MMHG

## 2023-04-20 VITALS — SYSTOLIC BLOOD PRESSURE: 209 MMHG | DIASTOLIC BLOOD PRESSURE: 87 MMHG

## 2023-04-20 VITALS — SYSTOLIC BLOOD PRESSURE: 163 MMHG | DIASTOLIC BLOOD PRESSURE: 76 MMHG

## 2023-04-20 VITALS — SYSTOLIC BLOOD PRESSURE: 168 MMHG | DIASTOLIC BLOOD PRESSURE: 91 MMHG

## 2023-04-20 VITALS — SYSTOLIC BLOOD PRESSURE: 209 MMHG | DIASTOLIC BLOOD PRESSURE: 82 MMHG

## 2023-04-20 VITALS — DIASTOLIC BLOOD PRESSURE: 88 MMHG | SYSTOLIC BLOOD PRESSURE: 200 MMHG

## 2023-04-20 VITALS — SYSTOLIC BLOOD PRESSURE: 130 MMHG | DIASTOLIC BLOOD PRESSURE: 51 MMHG

## 2023-04-20 VITALS — SYSTOLIC BLOOD PRESSURE: 176 MMHG | DIASTOLIC BLOOD PRESSURE: 82 MMHG

## 2023-04-20 VITALS — SYSTOLIC BLOOD PRESSURE: 171 MMHG | DIASTOLIC BLOOD PRESSURE: 85 MMHG

## 2023-04-20 VITALS — DIASTOLIC BLOOD PRESSURE: 94 MMHG | SYSTOLIC BLOOD PRESSURE: 212 MMHG

## 2023-04-20 VITALS — SYSTOLIC BLOOD PRESSURE: 191 MMHG | DIASTOLIC BLOOD PRESSURE: 76 MMHG

## 2023-04-20 VITALS — DIASTOLIC BLOOD PRESSURE: 57 MMHG | SYSTOLIC BLOOD PRESSURE: 137 MMHG

## 2023-04-20 VITALS — DIASTOLIC BLOOD PRESSURE: 83 MMHG | SYSTOLIC BLOOD PRESSURE: 177 MMHG

## 2023-04-20 VITALS — DIASTOLIC BLOOD PRESSURE: 70 MMHG | SYSTOLIC BLOOD PRESSURE: 165 MMHG

## 2023-04-20 VITALS — DIASTOLIC BLOOD PRESSURE: 81 MMHG | SYSTOLIC BLOOD PRESSURE: 188 MMHG

## 2023-04-20 VITALS — SYSTOLIC BLOOD PRESSURE: 202 MMHG | DIASTOLIC BLOOD PRESSURE: 83 MMHG

## 2023-04-20 VITALS — DIASTOLIC BLOOD PRESSURE: 69 MMHG | SYSTOLIC BLOOD PRESSURE: 169 MMHG

## 2023-04-20 VITALS — SYSTOLIC BLOOD PRESSURE: 214 MMHG | DIASTOLIC BLOOD PRESSURE: 80 MMHG

## 2023-04-20 VITALS — DIASTOLIC BLOOD PRESSURE: 78 MMHG | SYSTOLIC BLOOD PRESSURE: 198 MMHG

## 2023-04-20 VITALS — DIASTOLIC BLOOD PRESSURE: 81 MMHG | SYSTOLIC BLOOD PRESSURE: 206 MMHG

## 2023-04-20 VITALS — SYSTOLIC BLOOD PRESSURE: 199 MMHG | DIASTOLIC BLOOD PRESSURE: 81 MMHG

## 2023-04-20 VITALS — DIASTOLIC BLOOD PRESSURE: 83 MMHG | SYSTOLIC BLOOD PRESSURE: 195 MMHG

## 2023-04-20 VITALS — SYSTOLIC BLOOD PRESSURE: 176 MMHG | DIASTOLIC BLOOD PRESSURE: 68 MMHG

## 2023-04-20 VITALS — DIASTOLIC BLOOD PRESSURE: 61 MMHG | SYSTOLIC BLOOD PRESSURE: 162 MMHG

## 2023-04-20 VITALS — DIASTOLIC BLOOD PRESSURE: 80 MMHG | SYSTOLIC BLOOD PRESSURE: 185 MMHG

## 2023-04-20 VITALS — SYSTOLIC BLOOD PRESSURE: 168 MMHG | DIASTOLIC BLOOD PRESSURE: 73 MMHG

## 2023-04-20 DIAGNOSIS — Z95.5: ICD-10-CM

## 2023-04-20 DIAGNOSIS — D63.1: ICD-10-CM

## 2023-04-20 DIAGNOSIS — Z91.15: ICD-10-CM

## 2023-04-20 DIAGNOSIS — N25.81: ICD-10-CM

## 2023-04-20 DIAGNOSIS — E11.22: ICD-10-CM

## 2023-04-20 DIAGNOSIS — B19.20: ICD-10-CM

## 2023-04-20 DIAGNOSIS — F17.210: ICD-10-CM

## 2023-04-20 DIAGNOSIS — I16.0: Primary | ICD-10-CM

## 2023-04-20 DIAGNOSIS — E87.79: ICD-10-CM

## 2023-04-20 DIAGNOSIS — N18.6: ICD-10-CM

## 2023-04-20 DIAGNOSIS — I12.0: ICD-10-CM

## 2023-04-20 DIAGNOSIS — F32.A: ICD-10-CM

## 2023-04-20 DIAGNOSIS — M10.9: ICD-10-CM

## 2023-04-20 DIAGNOSIS — K74.60: ICD-10-CM

## 2023-04-20 DIAGNOSIS — Z95.828: ICD-10-CM

## 2023-04-20 DIAGNOSIS — Z99.2: ICD-10-CM

## 2023-04-20 DIAGNOSIS — E11.40: ICD-10-CM

## 2023-04-20 DIAGNOSIS — Z20.822: ICD-10-CM

## 2023-04-20 LAB
ALBUMIN SERPL-MCNC: 4.9 G/DL (ref 3.2–5)
ALP SERPL-CCNC: 66 U/L (ref 38–126)
ANION GAP SERPL CALCULATED.3IONS-SCNC: 19 MMOL/L
APTT PPP: 26.8 SECONDS (ref 20–32.5)
AST SERPL-CCNC: 25 U/L (ref 9–36)
BASOPHILS NFR BLD AUTO: 0.4 % (ref 0–3)
BUN SERPL-MCNC: 28 MG/DL (ref 8–23)
BUN/CREAT SERPL: 6
CHLORIDE SERPL-SCNC: 100 MMOL/L (ref 95–108)
CO2 SERPL-SCNC: 28 MMOL/L (ref 22–30)
CREAT SERPL-MCNC: 5 MG/DL (ref 0.5–1)
EOSINOPHIL NFR BLD AUTO: 2.2 % (ref 0–8)
ERYTHROCYTE [DISTWIDTH] IN BLOOD BY AUTOMATED COUNT: 17.6 % (ref 11.5–15.5)
HCT VFR BLD AUTO: 36.2 % (ref 37–47)
HGB BLD-MCNC: 12.3 G/DL (ref 12–16)
IMM GRANULOCYTES NFR BLD: 0.2 % (ref 0–5)
INR PPP: 1.3 RATIO (ref 0.7–1.3)
LYMPHOCYTES NFR BLD: 7.7 % (ref 15–41)
MCH RBC QN AUTO: 28.7 PG  CALC (ref 26–32)
MCHC RBC AUTO-ENTMCNC: 34 G/DL CAL (ref 32–36)
MCV RBC AUTO: 84.4 FL  CALC (ref 80–100)
MONOCYTES NFR BLD AUTO: 8.6 % (ref 2–13)
NEUTROPHILS # BLD AUTO: 6.68 THOU/UL (ref 2–7.15)
NEUTROPHILS NFR BLD AUTO: 80.9 % (ref 42–76)
PLATELET # BLD AUTO: 188 THOU/UL (ref 130–400)
POTASSIUM SERPL-SCNC: 4.1 MMOL/L (ref 3.5–5.1)
PROT SERPL-MCNC: 8.8 G/DL (ref 6.3–8.2)
PROTHROMBIN TIME: 12.6 SECONDS (ref 9–12.5)
RBC # BLD AUTO: 4.29 MILL/UL (ref 4.2–5.6)
SODIUM SERPL-SCNC: 143 MMOL/L (ref 137–146)

## 2023-04-20 PROCEDURE — 5A1D70Z PERFORMANCE OF URINARY FILTRATION, INTERMITTENT, LESS THAN 6 HOURS PER DAY: ICD-10-PCS | Performed by: INTERNAL MEDICINE

## 2023-04-21 VITALS — DIASTOLIC BLOOD PRESSURE: 62 MMHG | SYSTOLIC BLOOD PRESSURE: 127 MMHG

## 2023-04-21 VITALS — SYSTOLIC BLOOD PRESSURE: 181 MMHG | DIASTOLIC BLOOD PRESSURE: 77 MMHG

## 2023-04-21 VITALS — DIASTOLIC BLOOD PRESSURE: 63 MMHG | SYSTOLIC BLOOD PRESSURE: 143 MMHG

## 2023-04-21 VITALS — DIASTOLIC BLOOD PRESSURE: 59 MMHG | SYSTOLIC BLOOD PRESSURE: 124 MMHG

## 2023-04-21 VITALS — SYSTOLIC BLOOD PRESSURE: 111 MMHG | DIASTOLIC BLOOD PRESSURE: 78 MMHG

## 2023-04-21 VITALS — SYSTOLIC BLOOD PRESSURE: 182 MMHG | DIASTOLIC BLOOD PRESSURE: 65 MMHG

## 2023-04-21 VITALS — SYSTOLIC BLOOD PRESSURE: 146 MMHG | DIASTOLIC BLOOD PRESSURE: 62 MMHG

## 2023-04-21 VITALS — SYSTOLIC BLOOD PRESSURE: 168 MMHG | DIASTOLIC BLOOD PRESSURE: 68 MMHG

## 2023-04-21 VITALS — SYSTOLIC BLOOD PRESSURE: 161 MMHG | DIASTOLIC BLOOD PRESSURE: 69 MMHG

## 2023-04-21 VITALS — DIASTOLIC BLOOD PRESSURE: 53 MMHG | SYSTOLIC BLOOD PRESSURE: 128 MMHG

## 2023-04-21 VITALS — DIASTOLIC BLOOD PRESSURE: 72 MMHG | SYSTOLIC BLOOD PRESSURE: 182 MMHG

## 2023-04-21 VITALS — SYSTOLIC BLOOD PRESSURE: 120 MMHG | DIASTOLIC BLOOD PRESSURE: 52 MMHG

## 2023-04-21 VITALS — DIASTOLIC BLOOD PRESSURE: 52 MMHG | SYSTOLIC BLOOD PRESSURE: 133 MMHG

## 2023-04-21 VITALS — SYSTOLIC BLOOD PRESSURE: 119 MMHG | DIASTOLIC BLOOD PRESSURE: 54 MMHG

## 2023-04-21 VITALS — DIASTOLIC BLOOD PRESSURE: 57 MMHG | SYSTOLIC BLOOD PRESSURE: 130 MMHG

## 2023-04-21 VITALS — SYSTOLIC BLOOD PRESSURE: 132 MMHG | DIASTOLIC BLOOD PRESSURE: 54 MMHG

## 2023-04-21 VITALS — DIASTOLIC BLOOD PRESSURE: 57 MMHG | SYSTOLIC BLOOD PRESSURE: 133 MMHG

## 2023-04-21 VITALS — DIASTOLIC BLOOD PRESSURE: 64 MMHG | SYSTOLIC BLOOD PRESSURE: 161 MMHG

## 2023-04-21 VITALS — SYSTOLIC BLOOD PRESSURE: 137 MMHG | DIASTOLIC BLOOD PRESSURE: 63 MMHG

## 2023-04-21 VITALS — SYSTOLIC BLOOD PRESSURE: 163 MMHG | DIASTOLIC BLOOD PRESSURE: 63 MMHG

## 2023-04-21 VITALS — DIASTOLIC BLOOD PRESSURE: 70 MMHG | SYSTOLIC BLOOD PRESSURE: 138 MMHG

## 2023-04-21 VITALS — DIASTOLIC BLOOD PRESSURE: 77 MMHG | SYSTOLIC BLOOD PRESSURE: 181 MMHG

## 2023-04-21 VITALS — SYSTOLIC BLOOD PRESSURE: 156 MMHG | DIASTOLIC BLOOD PRESSURE: 63 MMHG

## 2023-04-21 VITALS — SYSTOLIC BLOOD PRESSURE: 135 MMHG | DIASTOLIC BLOOD PRESSURE: 57 MMHG

## 2023-04-21 VITALS — SYSTOLIC BLOOD PRESSURE: 130 MMHG | DIASTOLIC BLOOD PRESSURE: 57 MMHG

## 2023-04-21 VITALS — SYSTOLIC BLOOD PRESSURE: 148 MMHG | DIASTOLIC BLOOD PRESSURE: 70 MMHG

## 2023-04-21 VITALS — DIASTOLIC BLOOD PRESSURE: 64 MMHG | SYSTOLIC BLOOD PRESSURE: 135 MMHG

## 2023-04-21 VITALS — SYSTOLIC BLOOD PRESSURE: 180 MMHG | DIASTOLIC BLOOD PRESSURE: 74 MMHG

## 2023-04-21 VITALS — DIASTOLIC BLOOD PRESSURE: 68 MMHG | SYSTOLIC BLOOD PRESSURE: 187 MMHG

## 2023-04-21 VITALS — SYSTOLIC BLOOD PRESSURE: 129 MMHG | DIASTOLIC BLOOD PRESSURE: 60 MMHG

## 2023-04-21 VITALS — DIASTOLIC BLOOD PRESSURE: 63 MMHG | SYSTOLIC BLOOD PRESSURE: 139 MMHG

## 2023-04-21 VITALS — DIASTOLIC BLOOD PRESSURE: 58 MMHG | SYSTOLIC BLOOD PRESSURE: 132 MMHG

## 2023-04-21 VITALS — SYSTOLIC BLOOD PRESSURE: 125 MMHG | DIASTOLIC BLOOD PRESSURE: 57 MMHG

## 2023-04-21 VITALS — DIASTOLIC BLOOD PRESSURE: 62 MMHG | SYSTOLIC BLOOD PRESSURE: 142 MMHG

## 2023-04-21 VITALS — DIASTOLIC BLOOD PRESSURE: 57 MMHG | SYSTOLIC BLOOD PRESSURE: 128 MMHG

## 2023-04-21 VITALS — SYSTOLIC BLOOD PRESSURE: 134 MMHG | DIASTOLIC BLOOD PRESSURE: 64 MMHG

## 2023-04-21 VITALS — SYSTOLIC BLOOD PRESSURE: 171 MMHG | DIASTOLIC BLOOD PRESSURE: 68 MMHG

## 2023-04-21 VITALS — SYSTOLIC BLOOD PRESSURE: 147 MMHG | DIASTOLIC BLOOD PRESSURE: 66 MMHG

## 2023-04-21 VITALS — SYSTOLIC BLOOD PRESSURE: 134 MMHG | DIASTOLIC BLOOD PRESSURE: 65 MMHG

## 2023-04-21 VITALS — SYSTOLIC BLOOD PRESSURE: 123 MMHG | DIASTOLIC BLOOD PRESSURE: 51 MMHG

## 2023-04-21 VITALS — SYSTOLIC BLOOD PRESSURE: 149 MMHG | DIASTOLIC BLOOD PRESSURE: 66 MMHG

## 2023-04-21 VITALS — DIASTOLIC BLOOD PRESSURE: 86 MMHG | SYSTOLIC BLOOD PRESSURE: 182 MMHG

## 2023-04-21 VITALS — DIASTOLIC BLOOD PRESSURE: 77 MMHG | SYSTOLIC BLOOD PRESSURE: 172 MMHG

## 2023-04-21 VITALS — SYSTOLIC BLOOD PRESSURE: 176 MMHG | DIASTOLIC BLOOD PRESSURE: 77 MMHG

## 2023-04-21 LAB
ALBUMIN SERPL-MCNC: 4.3 G/DL (ref 3.2–5)
ALP SERPL-CCNC: 43 U/L (ref 38–126)
ANION GAP SERPL CALCULATED.3IONS-SCNC: 16 MMOL/L
AST SERPL-CCNC: 21 U/L (ref 9–36)
BACTERIA #/AREA URNS HPF: (no result) HPF
BILIRUB UR QL STRIP.AUTO: NEGATIVE
BUN SERPL-MCNC: 25 MG/DL (ref 8–23)
BUN/CREAT SERPL: 6
CHLORIDE SERPL-SCNC: 103 MMOL/L (ref 95–108)
CO2 SERPL-SCNC: 25 MMOL/L (ref 22–30)
COLOR UR AUTO: YELLOW
CREAT SERPL-MCNC: 4.4 MG/DL (ref 0.5–1)
ERYTHROCYTE [DISTWIDTH] IN BLOOD BY AUTOMATED COUNT: 17.5 % (ref 11.5–15.5)
GLUCOSE UR STRIP.AUTO-MCNC: NEGATIVE MG/DL
HCT VFR BLD AUTO: 31.4 % (ref 37–47)
HGB BLD-MCNC: 10.7 G/DL (ref 12–16)
HGB UR QL STRIP.AUTO: (no result)
KETONES UR STRIP.AUTO-MCNC: NEGATIVE MG/DL
LEUKOCYTE ESTERASE UR QL STRIP.AUTO: (no result)
MAGNESIUM SERPL-MCNC: 2 MG/DL (ref 1.6–2.3)
MCH RBC QN AUTO: 28.8 PG  CALC (ref 26–32)
MCHC RBC AUTO-ENTMCNC: 34.1 G/DL CAL (ref 32–36)
MCV RBC AUTO: 84.4 FL  CALC (ref 80–100)
NITRITE UR QL STRIP.AUTO: NEGATIVE
PH UR STRIP.AUTO: 8.5 [PH] (ref 4.5–8)
PLATELET # BLD AUTO: 171 THOU/UL (ref 130–400)
POTASSIUM SERPL-SCNC: 3.7 MMOL/L (ref 3.5–5.1)
PROT SERPL-MCNC: 7.4 G/DL (ref 6.3–8.2)
PROT UR QL STRIP.AUTO: >=300 MG/DL
RBC # BLD AUTO: 3.72 MILL/UL (ref 4.2–5.6)
RBC #/AREA URNS HPF: (no result) RBC/HPF (ref 0–5)
SODIUM SERPL-SCNC: 140 MMOL/L (ref 137–146)
SP GR UR STRIP.AUTO: 1.01
SQUAMOUS URNS QL MICRO: (no result) EPI/HPF
UROBILINOGEN UR QL STRIP.AUTO: 0.2 E.U./DL
WBC #/AREA URNS HPF: (no result) WBC/HPF (ref 0–5)